# Patient Record
Sex: MALE | Race: WHITE
[De-identification: names, ages, dates, MRNs, and addresses within clinical notes are randomized per-mention and may not be internally consistent; named-entity substitution may affect disease eponyms.]

---

## 2019-06-21 ENCOUNTER — HOSPITAL ENCOUNTER (INPATIENT)
Dept: HOSPITAL 26 - MED | Age: 69
LOS: 3 days | Discharge: HOME | DRG: 280 | End: 2019-06-24
Attending: GENERAL PRACTICE | Admitting: GENERAL PRACTICE
Payer: COMMERCIAL

## 2019-06-21 VITALS — SYSTOLIC BLOOD PRESSURE: 124 MMHG | DIASTOLIC BLOOD PRESSURE: 82 MMHG

## 2019-06-21 VITALS — SYSTOLIC BLOOD PRESSURE: 151 MMHG | DIASTOLIC BLOOD PRESSURE: 106 MMHG

## 2019-06-21 VITALS — WEIGHT: 178 LBS | BODY MASS INDEX: 26.98 KG/M2 | HEIGHT: 68 IN

## 2019-06-21 VITALS — SYSTOLIC BLOOD PRESSURE: 118 MMHG | DIASTOLIC BLOOD PRESSURE: 76 MMHG

## 2019-06-21 VITALS — SYSTOLIC BLOOD PRESSURE: 115 MMHG | DIASTOLIC BLOOD PRESSURE: 69 MMHG

## 2019-06-21 VITALS — DIASTOLIC BLOOD PRESSURE: 87 MMHG | SYSTOLIC BLOOD PRESSURE: 119 MMHG

## 2019-06-21 VITALS — SYSTOLIC BLOOD PRESSURE: 96 MMHG | DIASTOLIC BLOOD PRESSURE: 78 MMHG

## 2019-06-21 DIAGNOSIS — F17.200: ICD-10-CM

## 2019-06-21 DIAGNOSIS — F15.10: ICD-10-CM

## 2019-06-21 DIAGNOSIS — Z79.899: ICD-10-CM

## 2019-06-21 DIAGNOSIS — J44.1: ICD-10-CM

## 2019-06-21 DIAGNOSIS — Z71.6: ICD-10-CM

## 2019-06-21 DIAGNOSIS — Z91.14: ICD-10-CM

## 2019-06-21 DIAGNOSIS — I21.A1: Primary | ICD-10-CM

## 2019-06-21 DIAGNOSIS — Z23: ICD-10-CM

## 2019-06-21 DIAGNOSIS — I48.2: ICD-10-CM

## 2019-06-21 DIAGNOSIS — Z79.82: ICD-10-CM

## 2019-06-21 DIAGNOSIS — I50.43: ICD-10-CM

## 2019-06-21 DIAGNOSIS — I11.0: ICD-10-CM

## 2019-06-21 DIAGNOSIS — I42.9: ICD-10-CM

## 2019-06-21 DIAGNOSIS — Z71.51: ICD-10-CM

## 2019-06-21 LAB
ALBUMIN FLD-MCNC: 3.4 G/DL (ref 3.4–5)
ANION GAP SERPL CALCULATED.3IONS-SCNC: 10 MMOL/L (ref 8–16)
APPEARANCE UR: CLEAR
AST SERPL-CCNC: 24 U/L (ref 15–37)
BARBITURATES UR QL SCN: (no result) NG/ML
BASOPHILS # BLD AUTO: 0 K/UL (ref 0–0.22)
BASOPHILS NFR BLD AUTO: 0.4 % (ref 0–2)
BENZODIAZ UR QL SCN: (no result) NG/ML
BILIRUB SERPL-MCNC: 1.1 MG/DL (ref 0–1)
BILIRUB UR QL STRIP: NEGATIVE
BUN SERPL-MCNC: 13 MG/DL (ref 7–18)
BZE UR QL SCN: (no result) NG/ML
CANNABINOIDS UR QL SCN: (no result) NG/ML
CHLORIDE SERPL-SCNC: 106 MMOL/L (ref 98–107)
CHOLEST/HDLC SERPL: 3 {RATIO} (ref 1–4.5)
CK MB SERPL-MCNC: 2.5 NG/ML (ref 0–3.6)
CO2 SERPL-SCNC: 27.1 MMOL/L (ref 21–32)
COLOR UR: YELLOW
CREAT SERPL-MCNC: 1.1 MG/DL (ref 0.7–1.3)
EOSINOPHIL # BLD AUTO: 0.1 K/UL (ref 0–0.4)
EOSINOPHIL NFR BLD AUTO: 1 % (ref 0–4)
ERYTHROCYTE [DISTWIDTH] IN BLOOD BY AUTOMATED COUNT: 18.6 % (ref 11.6–13.7)
GFR SERPL CREATININE-BSD FRML MDRD: 85 ML/MIN (ref 90–?)
GLUCOSE SERPL-MCNC: 116 MG/DL (ref 74–106)
GLUCOSE UR STRIP-MCNC: NEGATIVE MG/DL
HCT VFR BLD AUTO: 40.8 % (ref 36–52)
HDLC SERPL-MCNC: 37 MG/DL (ref 40–60)
HGB BLD-MCNC: 13.2 G/DL (ref 12–18)
HGB UR QL STRIP: NEGATIVE
LDLC SERPL CALC-MCNC: 59 MG/DL (ref 60–100)
LEUKOCYTE ESTERASE UR QL STRIP: NEGATIVE
LYMPHOCYTES # BLD AUTO: 1.9 K/UL (ref 2–11.5)
LYMPHOCYTES NFR BLD AUTO: 19.4 % (ref 20.5–51.1)
MAGNESIUM SERPL-MCNC: 2 MG/DL (ref 1.8–2.4)
MCH RBC QN AUTO: 25 PG (ref 27–31)
MCHC RBC AUTO-ENTMCNC: 32 G/DL (ref 33–37)
MCV RBC AUTO: 76.9 FL (ref 80–94)
MONOCYTES # BLD AUTO: 0.7 K/UL (ref 0.8–1)
MONOCYTES NFR BLD AUTO: 7.3 % (ref 1.7–9.3)
NEUTROPHILS # BLD AUTO: 6.9 K/UL (ref 1.8–7.7)
NEUTROPHILS NFR BLD AUTO: 71.9 % (ref 42.2–75.2)
NITRITE UR QL STRIP: NEGATIVE
OPIATES UR QL SCN: (no result) NG/ML
PCP UR QL SCN: (no result) NG/ML
PH UR STRIP: 7 [PH] (ref 5–9)
PHOSPHATE SERPL-MCNC: 3 MG/DL (ref 2.5–4.9)
PLATELET # BLD AUTO: 306 K/UL (ref 140–450)
POTASSIUM SERPL-SCNC: 4.1 MMOL/L (ref 3.5–5.1)
PROTHROMBIN TIME: 10.1 SECS (ref 10.8–13.4)
RBC # BLD AUTO: 5.3 MIL/UL (ref 4.2–6.1)
SODIUM SERPL-SCNC: 139 MMOL/L (ref 136–145)
TRIGL SERPL-MCNC: 77 MG/DL (ref 30–150)
TSH SERPL DL<=0.05 MIU/L-ACNC: 2.27 UIU/ML (ref 0.34–3.74)
WBC # BLD AUTO: 9.7 K/UL (ref 4.8–10.8)

## 2019-06-21 RX ADMIN — RIVAROXABAN SCH MG: 10 TABLET, FILM COATED ORAL at 16:49

## 2019-06-21 RX ADMIN — Medication SCH MG: at 09:00

## 2019-06-21 RX ADMIN — LISINOPRIL SCH MG: 5 TABLET ORAL at 09:02

## 2019-06-21 RX ADMIN — NICOTINE SCH MG: 21 PATCH, EXTENDED RELEASE TOPICAL at 08:58

## 2019-06-21 NOTE — NUR
PT SEEN AWAKE, TACHYPNEIC, MOUTH BREATHER, INSTRUCTED PT TO BREATH THROUGH HIS NOSTRILS, O2 
SAT-95% ON 2L NC, MONITORED CLOSELY.

## 2019-06-21 NOTE — NUR
AMBULATED TO  WITH STEADY GAIT AND VOIDED FREELY WITH CLEAR YELLOW URINE 150ML, URINE 
SPECIMEN SENT TO LAB FOR TEST, PT REFUSED TO TAKE OFF HIS PANTS, ENCOURAGE TO TAKE A SHOWER 
OR BRUSH HIS TEETH BUT PT STATED "NOT RIGHT NOW", DR BAUER MADE AWARE.

## 2019-06-21 NOTE — NUR
PATIENT IS SITTING UP ON BED AND WATCHING TV. DENIES PAIN AND SOB. NO SIGNS OF DISTRESS 
NOTED. TELE MONITOR IN PLACE. BED IN LOW POSITION AND CALL LIGHT WITHIN REACH. INSTRUCTED 
PATIENT TO UES THE CALL LIGHT FOR ANY ASSISTANCE AND PATIENT WAS AWARE.

## 2019-06-21 NOTE — NUR
ENDORSED PATIENT AT BEDSIDE TO NIGHT SHIFT NURSE FOR CONTINUITY OF CARE. PATIENT IS AWAKE 
AND WATCHING TV ON BED AT THIS TIME. RESPIRATION EVEN AND UNLABORED ON RA, SPO2 IS 96%. NO 
SIGNS OF DISTRESS NOTED. TELE MONITOR IN PLACE. SAFETY MEASURES IN PLACE.

## 2019-06-21 NOTE — NUR
PATIENT IS AWAKE AND SITTING UP ON BED. DENIES PAIN AND SOB. ASKED IF PATIENT WOULD LIKE TO 
TAKE A SHOWER AT THIS TIME, PATIENT SAID " NAH, NOT NOW. I WILL LET YOU KNOW WHEN I WANT TO 
SHOWER." TELE MONITOR IN PLACE. BED IN LOW POSITION AND CALL LIGHT WITHIN REACH. INSTRUCTED 
PATIENT TO USE THE CALL LIGHT FOR ANY ASSISTANCE AND PATIENT WAS AWARE.

## 2019-06-21 NOTE — NUR
Patient will be admitted to care of DR VELASQUEZ. Admited to TELE.  Will go to room 
125-B. Belongings list completed.  Report to ROMEO PAREKH.

## 2019-06-21 NOTE — NUR
PATIENT CAME BACK FROM WALKING ON THE [address]. PATIENT DENIES PAIN, LIGHTHEADEDNESS AND 
SOB. REQUESTED FOR AN APPLE JUICE AND PROVIDED. ASKED IF PATIENT WANT TO TAKE A SHOWER AND 
PATIENT SAID " NO." CNA IS CHANGING PATIENT'S BED. NO SIGNS OF DISTRESS NOTED. TELE MONITOR 
IN PLACE. SPO2 MONITOR IN PLACE. BED IN LOW POSITION AND CALL LIGHT WITHIN REACH. INSTRUCTED 
PATIENT TO USE THE CALL LIGHT FOR ANY ASSISTANCE AND PATIENT WAS AWARE.

## 2019-06-21 NOTE — NUR
PT IN LOW BED WITH SIDE RAILS UP X2. HE IS AOX4, WITH NO S/S OF PAIN OR DISTRESS NOTED. V/S 
AS FOLLOWS T 98.7 P 60 R 18 B/P 129/87 02 97%. WIFE INOCENTE AT BEDSIDE. CALL SOTO IN REACH.

## 2019-06-21 NOTE — NUR
RECEIVED CRITICAL VALUE FOR TROPONIN 0.071 AND NOTIFIED DR ROSADO. DR ROSADO WAS AWARE. 
PER DR ROSADO, PATIENT HAS A CONSULT FOR DR EMMANUEL.

## 2019-06-21 NOTE — NUR
PATIENT IS RESTING ON BED AT THIS TIME. DENIES PAIN AND SOB. NO SIGNS OF DISTRESS NOTED.  
TELE MONITOR ATTACHED. SAFETY MEASURES IN PLACE. BED IN LOW POSITION AND CALL LIGHT WITHIN 
REACH. INSTRUCTED PATIENT TO USE THE CALL LIGHT FOR ANY ASSISTANCE AND PATIENT WAS AWARE.

## 2019-06-21 NOTE — NUR
ASSISTED PATIENT TO USE THE BATHROOM AND GET BACK ON BATHROOM. APPLIED 2LPM VIA NC ON 
PATIENT, AND SPO2 IS 99% AS THIS TIME. ASKED PATIENT IF HE WOULD LIKE TO TAKE A SHOWER, 
PATIENT SAID " NOT YET, MAYBE LATER. I DON'T FEEL LIKE IT NOW." SAFETY MEASURES IN PLACE. 
TELE MONITOR ATTACHED. SPO2 MONITOR ATTACHED. BED IN LOW POSITION AND CALL LIGHT WITHIN 
REACH. INSTRUCTED PATIENT TO USE THE CALL LIGHT FOR ANY ASSISTANCE AND PATIENT WAS AWARE.

## 2019-06-21 NOTE — NUR
PATIENT IS RESTING ON BED AT THIS TIME. NO SIGNS OF DISTRESS NOTED. TELE MONITOR ATTACHED. 
SAFETY MEASURES IN PLACE. BED IN LOW POSITION AND CALL LIGHT WITHIN REACH. INSTRUCTED 
PATIENT TO USE THE CALL LIGHT FOR ANY ASSISTANCE AND PATIENT VERBALIZED UNDERSTANDING.

## 2019-06-21 NOTE — NUR
RELIEVED REPORT AT BEDSIDE FORM BENY RN DAYSHIFT NURSE AT BEDSIDE FOR CONTINUITY OF CARE, 
PT IN STABLE CONDITION.

## 2019-06-21 NOTE — NUR
CHECKED BP PRIOR TO MED ADMINISTER, /72, MAP 86, PULSE 66. MEDICATION EDUCATION 
PROVIDED TO PATIENT AND PATIENT VERBALIZED UNDERSTANDING. ADMINISTERED MEDS AS PER MD ORDER, 
PATIENT TOLERATED WELL. PATIENT IS LYING DOWN ON BED AND TALKING TO WIFE HUNTER ON THE 
PHONE. RESPIRATION EVEN AND UNLABORED ON 2LPM VIA NC, SPO2 IS AT 96% AT THIS TIME. NO SIGNS 
OF DISTRESS NOTED. TELE MONITOR ATTACHED. BED IN LOW POSITION AND CALL LIGHT WITHIN REACH. 
INSTRUCTED PATIENT TO USE THE CALL LIGHT FOR ANY ASSISTANCE AND PATIENT WAS AWARE.

## 2019-06-21 NOTE — NUR
RECEIVED BEDSIDE REPORT FROM NIGHT SHIFT NURSE FOR CONTINUITY OF CARE. PATIENT IS AWAKE AND 
RESTING ON BED AT THIS TIME. PATIENT IS AAOX4. RESPIRATION EVEN AND UNLABORED ON 2LPM VIA 
NC, SPO2 IS AT 96%. DENIES PAIN AND SOB.  NO SIGNS OF DISTRESS NOTED. IV ON L [id number], 
INTACT AND CLEAN, SL. SKIN INTACT AND CLEAN. PATIENT IS ABLE TO AMBULATE WITH STANDBY 
ASSIST. DISCUSSED PLAN OF CARE WITH PATIENT, AND PATIENT VERBALIZED. TELE MONITOR IN PLACE. 
SPO2 MONITOR BY BEDSIDE. BED IN LOW POSITION AND CALL LIGHT WITHIN REACH. INSTRUCTED PATIENT 
TO USE THE CALL LIGHT FOR ANY ASSISTANCE, PATIENT IS AWARE.

## 2019-06-21 NOTE — NUR
PT TO ED WITH C/O SOB FOR PAST 2 DAYS . PT HAS HX OF A.FIB, CHF AND HTN AND HAS 
BEEN UNABLE TO TAKE MEDICATIONS FOR PAST 2 MONTHS DOES NOT RECALL NAMES OF 
MEDICATIONS OTHER THAN XARELTO. PT AWAKE AND SPEAKS SENTENCES BUT BECOMES SOB 
VERY EASILY. LUNG SOUNDS DIMISHED BILATERALLY. PT PLACED INTO BED, ER MD AT 
BEDSIDE.

## 2019-06-22 VITALS — DIASTOLIC BLOOD PRESSURE: 80 MMHG | SYSTOLIC BLOOD PRESSURE: 112 MMHG

## 2019-06-22 VITALS — DIASTOLIC BLOOD PRESSURE: 98 MMHG | SYSTOLIC BLOOD PRESSURE: 128 MMHG

## 2019-06-22 VITALS — SYSTOLIC BLOOD PRESSURE: 137 MMHG | DIASTOLIC BLOOD PRESSURE: 94 MMHG

## 2019-06-22 VITALS — DIASTOLIC BLOOD PRESSURE: 94 MMHG | SYSTOLIC BLOOD PRESSURE: 124 MMHG

## 2019-06-22 VITALS — SYSTOLIC BLOOD PRESSURE: 143 MMHG | DIASTOLIC BLOOD PRESSURE: 88 MMHG

## 2019-06-22 VITALS — SYSTOLIC BLOOD PRESSURE: 117 MMHG | DIASTOLIC BLOOD PRESSURE: 71 MMHG

## 2019-06-22 LAB
ANION GAP SERPL CALCULATED.3IONS-SCNC: 14.8 MMOL/L (ref 8–16)
BASOPHILS # BLD AUTO: 0 K/UL (ref 0–0.22)
BASOPHILS NFR BLD AUTO: 0.5 % (ref 0–2)
BUN SERPL-MCNC: 14 MG/DL (ref 7–18)
CHLORIDE SERPL-SCNC: 106 MMOL/L (ref 98–107)
CO2 SERPL-SCNC: 25.5 MMOL/L (ref 21–32)
CREAT SERPL-MCNC: 1.1 MG/DL (ref 0.7–1.3)
EOSINOPHIL # BLD AUTO: 0.1 K/UL (ref 0–0.4)
EOSINOPHIL NFR BLD AUTO: 1.9 % (ref 0–4)
ERYTHROCYTE [DISTWIDTH] IN BLOOD BY AUTOMATED COUNT: 18.7 % (ref 11.6–13.7)
GFR SERPL CREATININE-BSD FRML MDRD: 85 ML/MIN (ref 90–?)
GLUCOSE SERPL-MCNC: 94 MG/DL (ref 74–106)
HCT VFR BLD AUTO: 41.6 % (ref 36–52)
HGB BLD-MCNC: 13.4 G/DL (ref 12–18)
LYMPHOCYTES # BLD AUTO: 2.1 K/UL (ref 2–11.5)
LYMPHOCYTES NFR BLD AUTO: 28.2 % (ref 20.5–51.1)
MAGNESIUM SERPL-MCNC: 2.1 MG/DL (ref 1.8–2.4)
MCH RBC QN AUTO: 25 PG (ref 27–31)
MCHC RBC AUTO-ENTMCNC: 32 G/DL (ref 33–37)
MCV RBC AUTO: 77.3 FL (ref 80–94)
MONOCYTES # BLD AUTO: 0.8 K/UL (ref 0.8–1)
MONOCYTES NFR BLD AUTO: 10.5 % (ref 1.7–9.3)
NEUTROPHILS # BLD AUTO: 4.4 K/UL (ref 1.8–7.7)
NEUTROPHILS NFR BLD AUTO: 58.9 % (ref 42.2–75.2)
PHOSPHATE SERPL-MCNC: 3 MG/DL (ref 2.5–4.9)
PLATELET # BLD AUTO: 270 K/UL (ref 140–450)
POTASSIUM SERPL-SCNC: 4.3 MMOL/L (ref 3.5–5.1)
RBC # BLD AUTO: 5.39 MIL/UL (ref 4.2–6.1)
SODIUM SERPL-SCNC: 142 MMOL/L (ref 136–145)
WBC # BLD AUTO: 7.4 K/UL (ref 4.8–10.8)

## 2019-06-22 RX ADMIN — NICOTINE SCH MG: 21 PATCH, EXTENDED RELEASE TOPICAL at 09:39

## 2019-06-22 RX ADMIN — FUROSEMIDE SCH MG: 10 INJECTION, SOLUTION INTRAMUSCULAR; INTRAVENOUS at 20:25

## 2019-06-22 RX ADMIN — Medication SCH MG: at 09:40

## 2019-06-22 RX ADMIN — LISINOPRIL SCH MG: 5 TABLET ORAL at 09:40

## 2019-06-22 RX ADMIN — RIVAROXABAN SCH MG: 10 TABLET, FILM COATED ORAL at 16:48

## 2019-06-22 NOTE — NUR
PT IS AWAKE AND DR. ABRAHAM CAME AND SPOKE TO PT, V/S CHECKED AND BP /73, PULSE IS 69. 
02 SATURATION IS 97%, NAZIA AND PATCH MEDICATIONS WERE GIVEN AND PT TOLERATED IT. NO SIGN OF 
DISTRESS NOTED AND WILL MONITOR PT.

## 2019-06-22 NOTE — NUR
PATIENT HAS BEEN SCREENED AND CATEGORIZED AS MODERATE NUTRITION RISK. PATIENT WILL BE SEEN 
WITHIN 3-5 DAYS OF ADMISSION.



6/23/19-6/25/19



GRACIELA DUNN RD

## 2019-06-22 NOTE — NUR
PT'S HEART RATE ON MONITOR WENT UP , PT VERBALIZED THAT HE FEELS OK. INFORMED DR. PINEDA 
AND SHOWED EKG READING, MD SAID THAT IT'S FINE. WILL MONITOR PT

## 2019-06-22 NOTE — NUR
CARE ENDORSED TO KASSANDRA WALTERS DAYSHIFT NURSE AT BEDSIDE FOR CONTINUITY OF CARE, PT IN STABLE 
CONDITION.

## 2019-06-22 NOTE — NUR
PT IN BED ASLEEP BUT AROUSABLE TO NAME AND LIGHT TOUCH.V/S A FOLLOWS T 97.0 P 62 R 18 B/P 
96/78 02 97% ON ROOM AIR. NO S/S OF PAIN OR DISTRESS NOTED. PT LAMBERTO ANY PAIN OR DISTRESS.

## 2019-06-22 NOTE — NUR
RECEIVED PATIENT FOR CONTINUITY OF CARE. PATIENT IS ALERT, AWAKE, LYING DOWN IN BED. ON ROOM 
AIR. WITH LEFT HAND 18 G SALINE LOCK. PATIENT IS IN STABLE CONDITION. WILL MONITOR PATIENT 
THROUGHOUT SHIFT.

## 2019-06-22 NOTE — NUR
PT IS AWAKE AND SEATED ON THE BED, VITAL SIGNS CHECKED AND BP /94, PULSE IS 82, 
TEMPERATURE IS 98.5, O2 SATURATION IS 99%, RESPIRATION IS 22/MIN AND EVEN.NO SIGN OF 
DISTRESS NOTED AN WILL MONITOR PT.

## 2019-06-22 NOTE — NUR
PT IN BED SLEEPING BUT AROUSABLE TO NAME, V/S AS FOLLOWS T T 97.8 P 62 R 18 B/P 143/88 02 
94% ON ROOM AIR.NO S/S OF PAIN OR DISTRESS NOTED.

## 2019-06-22 NOTE — NUR
RECEIVED PT FROM NIGHT SHIFT NURSE, PT IS AWAKE AND LYING ON THE BED WITH SIDE RAILS UP AND 
CALL LIGHT WITHIN REACH, PT HAS AN IV LINE ON THE LEFT HAND G.18 ON SALINE LOCK, PT DENIES 
PAIN AND NO SOB NOTED. WILL MONITOR PT.

## 2019-06-23 VITALS — DIASTOLIC BLOOD PRESSURE: 62 MMHG | SYSTOLIC BLOOD PRESSURE: 99 MMHG

## 2019-06-23 VITALS — SYSTOLIC BLOOD PRESSURE: 137 MMHG | DIASTOLIC BLOOD PRESSURE: 84 MMHG

## 2019-06-23 VITALS — SYSTOLIC BLOOD PRESSURE: 123 MMHG | DIASTOLIC BLOOD PRESSURE: 84 MMHG

## 2019-06-23 VITALS — SYSTOLIC BLOOD PRESSURE: 136 MMHG | DIASTOLIC BLOOD PRESSURE: 86 MMHG

## 2019-06-23 VITALS — SYSTOLIC BLOOD PRESSURE: 138 MMHG | DIASTOLIC BLOOD PRESSURE: 78 MMHG

## 2019-06-23 VITALS — SYSTOLIC BLOOD PRESSURE: 121 MMHG | DIASTOLIC BLOOD PRESSURE: 91 MMHG

## 2019-06-23 LAB
ANION GAP SERPL CALCULATED.3IONS-SCNC: 12.9 MMOL/L (ref 8–16)
BASOPHILS # BLD AUTO: 0 K/UL (ref 0–0.22)
BASOPHILS NFR BLD AUTO: 0.7 % (ref 0–2)
BUN SERPL-MCNC: 22 MG/DL (ref 7–18)
CHLORIDE SERPL-SCNC: 106 MMOL/L (ref 98–107)
CO2 SERPL-SCNC: 24.9 MMOL/L (ref 21–32)
CREAT SERPL-MCNC: 1.2 MG/DL (ref 0.7–1.3)
EOSINOPHIL # BLD AUTO: 0.2 K/UL (ref 0–0.4)
EOSINOPHIL NFR BLD AUTO: 2.9 % (ref 0–4)
ERYTHROCYTE [DISTWIDTH] IN BLOOD BY AUTOMATED COUNT: 18.3 % (ref 11.6–13.7)
GFR SERPL CREATININE-BSD FRML MDRD: 77 ML/MIN (ref 90–?)
GLUCOSE SERPL-MCNC: 97 MG/DL (ref 74–106)
HCT VFR BLD AUTO: 41.3 % (ref 36–52)
HGB BLD-MCNC: 13.5 G/DL (ref 12–18)
LYMPHOCYTES # BLD AUTO: 2 K/UL (ref 2–11.5)
LYMPHOCYTES NFR BLD AUTO: 30.5 % (ref 20.5–51.1)
MAGNESIUM SERPL-MCNC: 2 MG/DL (ref 1.8–2.4)
MCH RBC QN AUTO: 25 PG (ref 27–31)
MCHC RBC AUTO-ENTMCNC: 33 G/DL (ref 33–37)
MCV RBC AUTO: 76.5 FL (ref 80–94)
MONOCYTES # BLD AUTO: 0.6 K/UL (ref 0.8–1)
MONOCYTES NFR BLD AUTO: 9.6 % (ref 1.7–9.3)
NEUTROPHILS # BLD AUTO: 3.8 K/UL (ref 1.8–7.7)
NEUTROPHILS NFR BLD AUTO: 56.3 % (ref 42.2–75.2)
PHOSPHATE SERPL-MCNC: 3.8 MG/DL (ref 2.5–4.9)
PLATELET # BLD AUTO: 272 K/UL (ref 140–450)
POTASSIUM SERPL-SCNC: 3.8 MMOL/L (ref 3.5–5.1)
RBC # BLD AUTO: 5.4 MIL/UL (ref 4.2–6.1)
SODIUM SERPL-SCNC: 140 MMOL/L (ref 136–145)
WBC # BLD AUTO: 6.7 K/UL (ref 4.8–10.8)

## 2019-06-23 RX ADMIN — Medication SCH MG: at 08:55

## 2019-06-23 RX ADMIN — FUROSEMIDE SCH MG: 10 INJECTION, SOLUTION INTRAMUSCULAR; INTRAVENOUS at 08:56

## 2019-06-23 RX ADMIN — RIVAROXABAN SCH MG: 10 TABLET, FILM COATED ORAL at 16:36

## 2019-06-23 RX ADMIN — LISINOPRIL SCH MG: 5 TABLET ORAL at 08:56

## 2019-06-23 RX ADMIN — NICOTINE SCH MG: 21 PATCH, EXTENDED RELEASE TOPICAL at 08:54

## 2019-06-23 NOTE — NUR
PT IS AWAKE AND VITAL SIGNS CHECKED DONE, BP IS 99/62, PULSE , TEMP. IS 97.8, O2 
SATURATION IS 93%, RESPIRATION IS EVEN AND AT 18/MIN, NO SIGN OF DISTRESS NOTED AND WILL 
MONITOR PT.

## 2019-06-23 NOTE — NUR
PATIENT LYING DOWN, ASLEEP. VITAL SIGNS TAKEN AND CHARTED. PATIENT DENIES PAIN AT THIS TIME. 
WILL CONTINUE TO MONITOR PATIENT.

## 2019-06-23 NOTE — NUR
RECEIVED BEDSIDE REPORT FROM DAY SHIFT RN KASSANDRA. PT A/OX 4. DISCUSSED PLAN OF CARE. 
VERBALIZED UNDERSTANDING. ABLE TO MAKE NEEDS KNOWN. STD PRECAUTIONS. CONTINENT. AMBULATORY. 
STEADY GAIT. WIFE AT BEDSIDE. TELE MONITOR. CARDIAC DIET. ROOM AIR. NO SIGNS OF DISTRESS. 
SKIN IS INTACT. L HAND 18G SALINE LOCK. PATENT AND INTACT. BED IN LOW POSITION. CALL LIGHT 
WITHIN REACH. WILL CONTINUE TO MONITOR.

## 2019-06-23 NOTE — NUR
OPT'S HEART RATE WAS SEEN TO  IN THE CARDIAC MONITOR AND PT WAS CHECKED, PT IS 
ASYMPTOMATIC AND WAS ABOUT TO EAT HIS LUNCH. WILL MONITOR PT.

## 2019-06-23 NOTE — NUR
ROOM IS READY FOR PT IN BED A TO BE MOVED. PT IN BED (A) FAMILY IS STILL AT BEDSIDE. MADE PT 
AWARE THAT ROOM IS READY FOR PT IN BED A TO BE MOVED. WAITING ON NURSE FOR BED A. WILL 
CONTINUE TO MONITOR.

## 2019-06-23 NOTE — NUR
RECEIVED PT FROM NIGHT SHIFT NURSE, PT IS ASLEEP LYING ON THE BED WITH SIDE RAILS UP AND 
CALL LIGHT WITHIN REACH, PT HAS AN IV LINE ON THE LEFT HAND G. 18 ON SALINE LOCK, 
RESPIRATION IS EVEN AND NO SIGN OF DISTRESS NOTED. WILL MONITOR PT.

## 2019-06-23 NOTE — NUR
PT IS AWAKE AND PARAMETER CHECKED, ORAL MEDICATION WAS CHECKED AND PT TOLERATED IT. WILL 
MONITOR PT.

## 2019-06-23 NOTE — NUR
PT REQUESTED TO BE MOVED FROM ROOM. AND TO CALL WIFE TO TAKE HIM HOME DUE TO MUCH PEOPLE IN 
THE ROOM TALKING WITH THE PATIENT IN BED A. I ASKED PT IF WE CAN GET HIM SETTLED INTO 
ANOTHER ROOM WHERE IT WILL BE MORE QUIET. HE AGREED.

## 2019-06-23 NOTE — NUR
ENDORSED PATIENT TO AM SHIFT NURSE FOR CONTINUITY OF CARE. PATIENT IS LYING DOWN IN BED, ON 
STABLE CONDITION.

## 2019-06-23 NOTE — NUR
MADE ROUNDS. PATIENT LYING DOWN IN BED, ASLEEP WITH NO SIGNS OF DISTRESS. WILL CONTINUE TO 
MONITOR PATIENT.

## 2019-06-23 NOTE — NUR
PATIENT IS LYING DOWN, STILL SLEEPING, WITH NO SIGNS OF DISTRESS. WILL CONTINUE TO MONITOR 
PATIENT.

## 2019-06-23 NOTE — NUR
PT NO LONGER SHARING THE ROOM. BED A WAS MOVED TO ANOTHER ROOM. PT STATES HES MORE 
COMFORTABLE AND HE WILL STAY IN THE HOSPITAL AND NO LONGER BE LEAVING.

## 2019-06-23 NOTE — NUR
PT IS AWAKE AND VITAL SIGNS CHECKED DONE, BP IS 91/50, PULSE IS 62, O2 SATURATION IS 93%, 
ORAL MEDICATIONS WERE GIVEN BUT LASIX AND LISINOPRIL WERE NOT GIVEN TO PT AND WERE HELD 
BECAUSE OF THE LOW BP READING, DR. IRBY NOTIFIED. NO SIGN OF DISTRESS NOTED AND WILL 
MONITOR PT.

## 2019-06-24 VITALS — SYSTOLIC BLOOD PRESSURE: 118 MMHG | DIASTOLIC BLOOD PRESSURE: 75 MMHG

## 2019-06-24 VITALS — DIASTOLIC BLOOD PRESSURE: 77 MMHG | SYSTOLIC BLOOD PRESSURE: 99 MMHG

## 2019-06-24 VITALS — SYSTOLIC BLOOD PRESSURE: 120 MMHG | DIASTOLIC BLOOD PRESSURE: 77 MMHG

## 2019-06-24 LAB
ANION GAP SERPL CALCULATED.3IONS-SCNC: 11.7 MMOL/L (ref 8–16)
BASOPHILS # BLD AUTO: 0 K/UL (ref 0–0.22)
BASOPHILS NFR BLD AUTO: 0.6 % (ref 0–2)
BUN SERPL-MCNC: 25 MG/DL (ref 7–18)
CHLORIDE SERPL-SCNC: 107 MMOL/L (ref 98–107)
CO2 SERPL-SCNC: 26.6 MMOL/L (ref 21–32)
CREAT SERPL-MCNC: 1.3 MG/DL (ref 0.7–1.3)
EOSINOPHIL # BLD AUTO: 0.2 K/UL (ref 0–0.4)
EOSINOPHIL NFR BLD AUTO: 2.4 % (ref 0–4)
ERYTHROCYTE [DISTWIDTH] IN BLOOD BY AUTOMATED COUNT: 18.6 % (ref 11.6–13.7)
GFR SERPL CREATININE-BSD FRML MDRD: 70 ML/MIN (ref 90–?)
GLUCOSE SERPL-MCNC: 99 MG/DL (ref 74–106)
HCT VFR BLD AUTO: 42.3 % (ref 36–52)
HGB BLD-MCNC: 13.6 G/DL (ref 12–18)
LYMPHOCYTES # BLD AUTO: 2.4 K/UL (ref 2–11.5)
LYMPHOCYTES NFR BLD AUTO: 32.5 % (ref 20.5–51.1)
MCH RBC QN AUTO: 25 PG (ref 27–31)
MCHC RBC AUTO-ENTMCNC: 32 G/DL (ref 33–37)
MCV RBC AUTO: 77.2 FL (ref 80–94)
MONOCYTES # BLD AUTO: 0.6 K/UL (ref 0.8–1)
MONOCYTES NFR BLD AUTO: 8.6 % (ref 1.7–9.3)
NEUTROPHILS # BLD AUTO: 4.1 K/UL (ref 1.8–7.7)
NEUTROPHILS NFR BLD AUTO: 55.9 % (ref 42.2–75.2)
PLATELET # BLD AUTO: 297 K/UL (ref 140–450)
POTASSIUM SERPL-SCNC: 4.3 MMOL/L (ref 3.5–5.1)
RBC # BLD AUTO: 5.48 MIL/UL (ref 4.2–6.1)
SODIUM SERPL-SCNC: 141 MMOL/L (ref 136–145)
WBC # BLD AUTO: 7.4 K/UL (ref 4.8–10.8)

## 2019-06-24 PROCEDURE — 3E0234Z INTRODUCTION OF SERUM, TOXOID AND VACCINE INTO MUSCLE, PERCUTANEOUS APPROACH: ICD-10-PCS | Performed by: GENERAL PRACTICE

## 2019-06-24 RX ADMIN — LISINOPRIL SCH MG: 5 TABLET ORAL at 09:53

## 2019-06-24 RX ADMIN — Medication SCH MG: at 09:54

## 2019-06-24 RX ADMIN — NICOTINE SCH MG: 21 PATCH, EXTENDED RELEASE TOPICAL at 09:54

## 2019-06-24 NOTE — NUR
PT IS SLEEPING IN BED EASILY AROUSABLE. EVEN CHEST RISE. NO SOB. NO SIGNS OF DISTRESS. NO 
PAIN AT THIS TIME. WILL CONTINUE TO MONITOR.

## 2019-06-24 NOTE — NUR
RECEIVED BEDSIDE REPORT FROM NIGHT SHIFT NURSE. PT IS ASLEEP, NO S/S OF ANY ACUTE DISTRESS 
OR SOB. PT IS ON ROOM AIR, SKIN IS INTACT. IV IS ON THE L HAND, 18 G, SALINE LOCKED. PT IS 
AMBULATORY AND NOT A FALL RISK. PT IS ON 1500 ML/DAY FLUID RESTRICTION. CALL LIGHT IS WITHIN 
REACH. WILL CONTINUE TO MONITOR.

## 2019-06-24 NOTE — NUR
VITALS TAKEN. TOLERATED WELL. 98.2 TEMP. 120/77 BP. 97%. 119 PULSE. PT REMAINS TACHYCARDIC. 
WILL CONTINUE TO MONITOR.

## 2019-06-24 NOTE — NUR
PT HAS DISCHARGED. PNA VACCINE WAS GIVEN UPON DC. WRIST BANDS AND IV SITE REMOVED. DISCHARGE 
INSTRUCTIONS AND PRESCRIPTIONS GIVEN, PT VERBALIZED UNDERSTANDING. PT LEFT WITH HIS WIFE.

## 2019-06-24 NOTE — NUR
ASSISTED PT TO BATHROOM. AMBULATORY. STEADY GAIT. BACK IN BED. NO SIGNS OF RESP DISTRESS. NO 
SOB. WILL CONTINUE TO MONITOR.

## 2019-06-24 NOTE — NUR
WILL ENDORSE PT TO DAY SHIFT RN FOR CONTINUITY OF CARE. PT IN STABLE CONDITION. WILL 
CONTINUE TO MONITOR.

## 2019-06-24 NOTE — NUR
PT'S WIFE INOCENTE CALLED BACK, I TOLD HER THAT PT IS READY TO BE PICKED UP TO GO HOME. SHE 
SAID SHE WILL BE ON HER WAY SOON.

## 2019-09-10 ENCOUNTER — HOSPITAL ENCOUNTER (INPATIENT)
Dept: HOSPITAL 26 - MED | Age: 69
LOS: 3 days | Discharge: LEFT BEFORE DISCHARGE | DRG: 901 | End: 2019-09-13
Attending: GENERAL PRACTICE | Admitting: GENERAL PRACTICE
Payer: COMMERCIAL

## 2019-09-10 VITALS — WEIGHT: 151 LBS | HEIGHT: 68 IN | BODY MASS INDEX: 22.88 KG/M2

## 2019-09-10 VITALS — DIASTOLIC BLOOD PRESSURE: 107 MMHG | SYSTOLIC BLOOD PRESSURE: 149 MMHG

## 2019-09-10 VITALS — DIASTOLIC BLOOD PRESSURE: 90 MMHG | SYSTOLIC BLOOD PRESSURE: 150 MMHG

## 2019-09-10 VITALS — DIASTOLIC BLOOD PRESSURE: 73 MMHG | SYSTOLIC BLOOD PRESSURE: 125 MMHG

## 2019-09-10 VITALS — DIASTOLIC BLOOD PRESSURE: 75 MMHG | SYSTOLIC BLOOD PRESSURE: 105 MMHG

## 2019-09-10 VITALS — DIASTOLIC BLOOD PRESSURE: 86 MMHG | SYSTOLIC BLOOD PRESSURE: 128 MMHG

## 2019-09-10 DIAGNOSIS — L02.214: ICD-10-CM

## 2019-09-10 DIAGNOSIS — I48.91: ICD-10-CM

## 2019-09-10 DIAGNOSIS — K44.9: ICD-10-CM

## 2019-09-10 DIAGNOSIS — I11.0: ICD-10-CM

## 2019-09-10 DIAGNOSIS — Z91.14: ICD-10-CM

## 2019-09-10 DIAGNOSIS — F17.200: ICD-10-CM

## 2019-09-10 DIAGNOSIS — Z79.82: ICD-10-CM

## 2019-09-10 DIAGNOSIS — J44.1: ICD-10-CM
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
## 2019-09-10 DIAGNOSIS — F17.210: ICD-10-CM

## 2019-09-10 DIAGNOSIS — F15.10: ICD-10-CM

## 2019-09-10 DIAGNOSIS — Z53.21: ICD-10-CM

## 2019-09-10 DIAGNOSIS — Z91.19: ICD-10-CM

## 2019-09-10 DIAGNOSIS — I42.9: ICD-10-CM

## 2019-09-10 DIAGNOSIS — Z59.0: ICD-10-CM
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
## 2019-09-10 DIAGNOSIS — I50.43: ICD-10-CM

## 2019-09-10 DIAGNOSIS — T85.79XA: Primary | ICD-10-CM

## 2019-09-10 LAB
ALBUMIN FLD-MCNC: 3.2 G/DL (ref 3.4–5)
AMYLASE SERPL-CCNC: 40 U/L (ref 25–115)
ANION GAP SERPL CALCULATED.3IONS-SCNC: 13 MMOL/L (ref 8–16)
APPEARANCE UR: CLEAR
AST SERPL-CCNC: 20 U/L (ref 15–37)
BARBITURATES UR QL SCN: (no result) NG/ML
BASOPHILS # BLD AUTO: 0 K/UL (ref 0–0.22)
BASOPHILS NFR BLD AUTO: 0.3 % (ref 0–2)
BENZODIAZ UR QL SCN: (no result) NG/ML
BILIRUB SERPL-MCNC: 1.3 MG/DL (ref 0–1)
BILIRUB UR QL STRIP: NEGATIVE
BUN SERPL-MCNC: 20 MG/DL (ref 7–18)
BZE UR QL SCN: (no result) NG/ML
CANNABINOIDS UR QL SCN: (no result) NG/ML
CHLORIDE SERPL-SCNC: 109 MMOL/L (ref 98–107)
CHOLEST/HDLC SERPL: 2.9 {RATIO} (ref 1–4.5)
CO2 SERPL-SCNC: 25.8 MMOL/L (ref 21–32)
COLOR UR: YELLOW
CREAT SERPL-MCNC: 1.1 MG/DL (ref 0.7–1.3)
EOSINOPHIL # BLD AUTO: 0.1 K/UL (ref 0–0.4)
EOSINOPHIL NFR BLD AUTO: 1.5 % (ref 0–4)
ERYTHROCYTE [DISTWIDTH] IN BLOOD BY AUTOMATED COUNT: 18.1 % (ref 11.6–13.7)
GFR SERPL CREATININE-BSD FRML MDRD: 85 ML/MIN (ref 90–?)
GLUCOSE SERPL-MCNC: 108 MG/DL (ref 74–106)
GLUCOSE UR STRIP-MCNC: NEGATIVE MG/DL
HCT VFR BLD AUTO: 41.5 % (ref 36–52)
HDLC SERPL-MCNC: 36 MG/DL (ref 40–60)
HGB BLD-MCNC: 13.5 G/DL (ref 12–18)
HGB UR QL STRIP: NEGATIVE
LDLC SERPL CALC-MCNC: 45 MG/DL (ref 60–100)
LEUKOCYTE ESTERASE UR QL STRIP: NEGATIVE
LIPASE SERPL-CCNC: 183 U/L (ref 73–393)
LYMPHOCYTES # BLD AUTO: 2.2 K/UL (ref 2–11.5)
LYMPHOCYTES NFR BLD AUTO: 32.5 % (ref 20.5–51.1)
MAGNESIUM SERPL-MCNC: 2 MG/DL (ref 1.8–2.4)
MCH RBC QN AUTO: 26 PG (ref 27–31)
MCHC RBC AUTO-ENTMCNC: 33 G/DL (ref 33–37)
MCV RBC AUTO: 79.4 FL (ref 80–94)
MONOCYTES # BLD AUTO: 0.5 K/UL (ref 0.8–1)
MONOCYTES NFR BLD AUTO: 6.7 % (ref 1.7–9.3)
NEUTROPHILS # BLD AUTO: 4 K/UL (ref 1.8–7.7)
NEUTROPHILS NFR BLD AUTO: 59 % (ref 42.2–75.2)
NITRITE UR QL STRIP: NEGATIVE
OPIATES UR QL SCN: (no result) NG/ML
PCP UR QL SCN: (no result) NG/ML
PH UR STRIP: 5.5 [PH] (ref 5–9)
PHOSPHATE SERPL-MCNC: 4.5 MG/DL (ref 2.5–4.9)
PLATELET # BLD AUTO: 302 K/UL (ref 140–450)
POTASSIUM SERPL-SCNC: 3.8 MMOL/L (ref 3.5–5.1)
PROTHROMBIN TIME: 9.9 SECS (ref 10.8–13.4)
RBC # BLD AUTO: 5.23 MIL/UL (ref 4.2–6.1)
SODIUM SERPL-SCNC: 144 MMOL/L (ref 136–145)
T4 FREE SERPL-MCNC: 1.1 NG/DL (ref 0.76–1.46)
TRIGL SERPL-MCNC: 124 MG/DL (ref 30–150)
TSH SERPL DL<=0.05 MIU/L-ACNC: 2.95 UIU/ML (ref 0.34–3.74)
WBC # BLD AUTO: 6.8 K/UL (ref 4.8–10.8)

## 2019-09-10 RX ADMIN — FAMOTIDINE SCH MG: 20 TABLET ORAL at 09:44

## 2019-09-10 RX ADMIN — WATER SCH MG: 1 INJECTION INTRAMUSCULAR; INTRAVENOUS; SUBCUTANEOUS at 13:00

## 2019-09-10 RX ADMIN — IPRATROPIUM BROMIDE AND ALBUTEROL SULFATE SCH ML: .5; 3 SOLUTION RESPIRATORY (INHALATION) at 18:42

## 2019-09-10 RX ADMIN — IPRATROPIUM BROMIDE AND ALBUTEROL SULFATE SCH ML: .5; 3 SOLUTION RESPIRATORY (INHALATION) at 14:37

## 2019-09-10 RX ADMIN — WATER SCH MG: 1 INJECTION INTRAMUSCULAR; INTRAVENOUS; SUBCUTANEOUS at 21:34

## 2019-09-10 RX ADMIN — DOCUSATE SODIUM SCH MG: 100 CAPSULE, LIQUID FILLED ORAL at 09:44

## 2019-09-10 RX ADMIN — AZITHROMYCIN DIHYDRATE SCH MG: 250 TABLET, FILM COATED ORAL at 12:03

## 2019-09-10 RX ADMIN — DOCUSATE SODIUM SCH MG: 100 CAPSULE, LIQUID FILLED ORAL at 21:31

## 2019-09-10 RX ADMIN — HYDROCODONE BITARTRATE AND ACETAMINOPHEN PRN TAB: 7.5; 325 TABLET ORAL at 09:45

## 2019-09-10 RX ADMIN — NICOTINE SCH MG: 14 PATCH, EXTENDED RELEASE TOPICAL at 21:36

## 2019-09-10 SDOH — ECONOMIC STABILITY - HOUSING INSECURITY: HOMELESSNESS: Z59.0

## 2019-09-10 NOTE — NUR
ENDORSED PT TO PM RN PT AWAKE IN BED PT APPEARS STABLE AND IN NO APPARENT DISTRESS. ALL 
SAFETY MEASURES ARE IN PLACE,.

## 2019-09-10 NOTE — NUR
PT IN LOW BED WITH SIDE RAILS UP X2. PT IS AOX3 WITH NO S/S OF PAIN OR DISTRESS NOTED. V/S 
AS FOLLOWS T 97.7 P 60 R 18 B/P 121/76 02 92% ON ROOM AIR. PT IS SALINE LOCKED , NO C/O PAIN 
OR DISTRESS NOTED AL REQUESTED NEEDS ATTENDED.

## 2019-09-10 NOTE — NUR
PT ARRIVED ON THE UNIT AT 0725 RECEIVED HAND OFF REPORT FROM ER NURSE CARA PT IS AWAKE IN 
BED PT APPEARS STABLE AND IN NO APPARENT DISTRESS. PERFORMED HEAD TO TOE ASSESSMENT. 
COLLECTED URINE SPECIMEN AND MRSA NARES.

## 2019-09-10 NOTE — NUR
FREQUENT ROUNDING ON PT PT APPEARS STABLE AND IN NO APPARENT DISTRESS. ALL SAFETY MEASURES 
ARE IN PLACE.

## 2019-09-10 NOTE — NUR
FREQUENT ROUNDING ON  PT PT APPEARS STABLE AND IN NO APPARENT DISTRESS. ALL SAFETY MEASURES 
ARE IN  PLACE AND WILL CONTINUE TO MONITOR

## 2019-09-10 NOTE — NUR
PT CAME TO ER C/O SHORTNESS OF BREATH TODAY. PT RESPIRATIONS ARE LABORED. 
BREATH SOUNDS ARE BILATERAL WHEEZING THROUGHOUT. O2 SATURATION 94% ON ROOM AIR. 
PT DENIES PAIN, PAIN LEVEL 0/10. NKA. MED HX: A.FIB AND HTN. PT IS NONCOMPLIANT 
WITH MEDICATIONS. SAFETY MEASURES IN PLACE. PT PLACED IN HIGH MAJOR'S 
POSITION. ERMD MADE AWARE OF PT STATUS.

## 2019-09-10 NOTE — NUR
Patient will be admitted to care of Dr. Tavarez. Admited to tele.  Will go to 
room 106a. Belongings list completed.  Report to ROMEO Le.

## 2019-09-11 VITALS — SYSTOLIC BLOOD PRESSURE: 112 MMHG | DIASTOLIC BLOOD PRESSURE: 74 MMHG

## 2019-09-11 VITALS — DIASTOLIC BLOOD PRESSURE: 64 MMHG | SYSTOLIC BLOOD PRESSURE: 106 MMHG

## 2019-09-11 VITALS — SYSTOLIC BLOOD PRESSURE: 121 MMHG | DIASTOLIC BLOOD PRESSURE: 76 MMHG

## 2019-09-11 VITALS — DIASTOLIC BLOOD PRESSURE: 79 MMHG | SYSTOLIC BLOOD PRESSURE: 130 MMHG

## 2019-09-11 VITALS — DIASTOLIC BLOOD PRESSURE: 84 MMHG | SYSTOLIC BLOOD PRESSURE: 112 MMHG

## 2019-09-11 VITALS — DIASTOLIC BLOOD PRESSURE: 63 MMHG | SYSTOLIC BLOOD PRESSURE: 117 MMHG

## 2019-09-11 LAB
ANION GAP SERPL CALCULATED.3IONS-SCNC: 13.8 MMOL/L (ref 8–16)
BUN SERPL-MCNC: 26 MG/DL (ref 7–18)
CHLORIDE SERPL-SCNC: 105 MMOL/L (ref 98–107)
CO2 SERPL-SCNC: 25.9 MMOL/L (ref 21–32)
CREAT SERPL-MCNC: 1.1 MG/DL (ref 0.7–1.3)
ERYTHROCYTE [DISTWIDTH] IN BLOOD BY AUTOMATED COUNT: 17.9 % (ref 11.6–13.7)
GFR SERPL CREATININE-BSD FRML MDRD: 85 ML/MIN (ref 90–?)
GLUCOSE SERPL-MCNC: 148 MG/DL (ref 74–106)
HCT VFR BLD AUTO: 41 % (ref 36–52)
HGB BLD-MCNC: 13.2 G/DL (ref 12–18)
LYMPHOCYTES NFR BLD MANUAL: 6 % (ref 20–46)
MAGNESIUM SERPL-MCNC: 1.8 MG/DL (ref 1.8–2.4)
MCH RBC QN AUTO: 26 PG (ref 27–31)
MCHC RBC AUTO-ENTMCNC: 32 G/DL (ref 33–37)
MCV RBC AUTO: 79.5 FL (ref 80–94)
PHOSPHATE SERPL-MCNC: 4.6 MG/DL (ref 2.5–4.9)
PLATELET # BLD AUTO: 328 K/UL (ref 140–450)
POTASSIUM SERPL-SCNC: 3.7 MMOL/L (ref 3.5–5.1)
RBC # BLD AUTO: 5.16 MIL/UL (ref 4.2–6.1)
SODIUM SERPL-SCNC: 141 MMOL/L (ref 136–145)
WBC # BLD AUTO: 14.8 K/UL (ref 4.8–10.8)

## 2019-09-11 RX ADMIN — WATER SCH MG: 1 INJECTION INTRAMUSCULAR; INTRAVENOUS; SUBCUTANEOUS at 13:24

## 2019-09-11 RX ADMIN — IPRATROPIUM BROMIDE AND ALBUTEROL SULFATE SCH ML: .5; 3 SOLUTION RESPIRATORY (INHALATION) at 14:29

## 2019-09-11 RX ADMIN — Medication SCH MG: at 08:12

## 2019-09-11 RX ADMIN — IPRATROPIUM BROMIDE AND ALBUTEROL SULFATE SCH ML: .5; 3 SOLUTION RESPIRATORY (INHALATION) at 19:38

## 2019-09-11 RX ADMIN — LISINOPRIL SCH MG: 5 TABLET ORAL at 08:13

## 2019-09-11 RX ADMIN — IPRATROPIUM BROMIDE AND ALBUTEROL SULFATE SCH ML: .5; 3 SOLUTION RESPIRATORY (INHALATION) at 08:49

## 2019-09-11 RX ADMIN — HYDROCODONE BITARTRATE AND ACETAMINOPHEN PRN TAB: 7.5; 325 TABLET ORAL at 21:09

## 2019-09-11 RX ADMIN — AZITHROMYCIN DIHYDRATE SCH MG: 250 TABLET, FILM COATED ORAL at 11:17

## 2019-09-11 RX ADMIN — FUROSEMIDE SCH MG: 20 TABLET ORAL at 08:13

## 2019-09-11 RX ADMIN — FAMOTIDINE SCH MG: 20 TABLET ORAL at 08:13

## 2019-09-11 RX ADMIN — NICOTINE SCH MG: 14 PATCH, EXTENDED RELEASE TOPICAL at 08:44

## 2019-09-11 RX ADMIN — DOCUSATE SODIUM SCH MG: 100 CAPSULE, LIQUID FILLED ORAL at 08:12

## 2019-09-11 RX ADMIN — WATER SCH MG: 1 INJECTION INTRAMUSCULAR; INTRAVENOUS; SUBCUTANEOUS at 20:05

## 2019-09-11 RX ADMIN — DOCUSATE SODIUM SCH MG: 100 CAPSULE, LIQUID FILLED ORAL at 20:05

## 2019-09-11 NOTE — NUR
PT IN BED WATCHING TV, AAOX4. BREATHING EVEN AND UNLABORED. PT SHOWS NO SIGNS OF ACUTE 
DISTRESS AT THIS TIME. WILL CONTINUE TO ASSESS FOR CHANGES IN CONDITION.

## 2019-09-11 NOTE — NUR
RECEIVED PT ON BED WATCHING TV, AAOX4, ABLE TO MAKE NEEDS KNOWN, VITAL SIGNS TAKEN, 
UNCONTROLLED A-FIB/A-FLUTTER ON TELE, DENIES ANY PAIN, NO SOB NOTED, PLAN OF CARE DISCUSSED, 
SAFETY MEASURES IN PLACE, CALL LIGHT WITHIN REACH.

## 2019-09-11 NOTE — NUR
PT IN BED SLEEPING. NO SIGNS OF ACUTE DISTRESS AT THIS TIME. WILL CONTINUE TO ASSESS FOR 
CHANGES IN CONDITION.

## 2019-09-11 NOTE — NUR
PT ENDORSED TO NIGHT RNIGLESIA. PT IN BED, AAOX4. BREATHING EVEN AND UNLABORED. 22G IV TO R 
WRIST SALINE LOCK. VISITOR AT BEDSIDE. PT SHOWS NO SIGNS OF ACUTE DISTRESS AT THIS TIME.

## 2019-09-11 NOTE — NUR
DR WEILE CALLED TO ASK IF AZITHROMYCIN WAS OK TO ADMINISTER AFTER PT HAD 5-SEC V-TACH AT 
0937.  STATED IT WAS FINE BECAUSE MED CAN CAUSE Q-T PROLONGATION.

## 2019-09-11 NOTE — NUR
PT IN BED AAOX4. PT DENIES PAIN. NO SIGNS OF ACUTE DISTRESS AT THIS TIME. WILL CONTINUE TO 
ASSESS FOR CHANGES IN CONDITION.

## 2019-09-11 NOTE — NUR
PT IN BED WATCHING TV, AAOX4. PT SHOWS NO SIGNS OF ACUTE DISTRESS AT THIS TIME. WILL 
CONTINUE TO ASSESS FOR CHANGES IN CONDITION.

## 2019-09-11 NOTE — NUR
PT RECEIVED FROM NIGHT RNCHANELLE. PT SLEEPING IN BED, AROUSAL TO VERBAL COMMAND. 22 G IV TO 
R WRIST SALINE LOCK. PT SHOWS NO SIGNS OF ACUTE DISTRESS AT THIS TIME. WILL CONTINUE TO 
ASSESS FOR CHANGES IN CONDITION.

## 2019-09-11 NOTE — NUR
PT IN BED AAOX4. NO SIGNS OF ACUTE DISTRESS AT THIS TIME. WILL CONTINUE TO ASSESS FOR 
CHANGES IN CONDITION.

## 2019-09-11 NOTE — NUR
DUE MEDS GIVEN WITH EDUCATION PROVIDED, DR MCINTOSH HERE TO SEE PT, WITH NEW ORDER FOR SURGERY 
TOMORROW, INSTRUCTED NPO AFTER MIDNIGHT, VERBALIZED UNDERSTANDING.

## 2019-09-11 NOTE — NUR
PT HR-150-160'S ON THE TELEMETRY, SHOWING A-FLUTTER, PT SLEEPING, EASILY AROUSABLE, DENIES 
PAIN AND NO SOB NOTED, RADIAL PULSE PALPATED WITH 80-90 BPM, BP-118/58, SAT-95%, DR BRAR 
MADE AWARE AND CHECKED THE PT, WITH ORDER TO DO EKG, MONITORED PT CLOSELY,

## 2019-09-11 NOTE — NUR
PT IN BED IV SITE NOTED LEAKING AND PT SAID IT WAS PAINFUL. SITE REMOVED, NEW IV SITE 
PROVIDED ON R WRIST 22G AND FLUSHED PATENT. PT IS ZONIA LOCKED AT THIS TIME. PT DENIES PAIN 
AND V/S AS FOLLOWS T 97.7 P 60 R 18 B/P 121/76 02 92% ON ROOM AIR. BED LOW SIDE RAILS UP X2 
AND CALL BELL IN REACH.

## 2019-09-11 NOTE — NUR
PT RECEIVED ORDERED MEDS. AAOX4. PT RECEIVED TEACHING. PT SHOWS NO SIGNS OF ACUTE DISTRESS. 
WILL  CONTINUE TO MONITOR FOR CHANGES IN CONDITION.

## 2019-09-12 VITALS — DIASTOLIC BLOOD PRESSURE: 53 MMHG | SYSTOLIC BLOOD PRESSURE: 101 MMHG

## 2019-09-12 VITALS — SYSTOLIC BLOOD PRESSURE: 112 MMHG | DIASTOLIC BLOOD PRESSURE: 74 MMHG

## 2019-09-12 VITALS — SYSTOLIC BLOOD PRESSURE: 130 MMHG | DIASTOLIC BLOOD PRESSURE: 77 MMHG

## 2019-09-12 VITALS — DIASTOLIC BLOOD PRESSURE: 88 MMHG | SYSTOLIC BLOOD PRESSURE: 116 MMHG

## 2019-09-12 VITALS — SYSTOLIC BLOOD PRESSURE: 124 MMHG | DIASTOLIC BLOOD PRESSURE: 61 MMHG

## 2019-09-12 VITALS — SYSTOLIC BLOOD PRESSURE: 132 MMHG | DIASTOLIC BLOOD PRESSURE: 70 MMHG

## 2019-09-12 LAB
ANION GAP SERPL CALCULATED.3IONS-SCNC: 12.9 MMOL/L (ref 8–16)
BASOPHILS # BLD AUTO: 0 K/UL (ref 0–0.22)
BASOPHILS NFR BLD AUTO: 0 % (ref 0–2)
BUN SERPL-MCNC: 32 MG/DL (ref 7–18)
CHLORIDE SERPL-SCNC: 106 MMOL/L (ref 98–107)
CO2 SERPL-SCNC: 26.2 MMOL/L (ref 21–32)
CREAT SERPL-MCNC: 1.2 MG/DL (ref 0.7–1.3)
EOSINOPHIL # BLD AUTO: 0 K/UL (ref 0–0.4)
EOSINOPHIL NFR BLD AUTO: 0 % (ref 0–4)
ERYTHROCYTE [DISTWIDTH] IN BLOOD BY AUTOMATED COUNT: 18.1 % (ref 11.6–13.7)
GFR SERPL CREATININE-BSD FRML MDRD: 77 ML/MIN (ref 90–?)
GLUCOSE SERPL-MCNC: 136 MG/DL (ref 74–106)
HCT VFR BLD AUTO: 38.4 % (ref 36–52)
HGB BLD-MCNC: 12.3 G/DL (ref 12–18)
LYMPHOCYTES # BLD AUTO: 0.8 K/UL (ref 2–11.5)
LYMPHOCYTES NFR BLD AUTO: 4.8 % (ref 20.5–51.1)
MAGNESIUM SERPL-MCNC: 1.9 MG/DL (ref 1.8–2.4)
MCH RBC QN AUTO: 25 PG (ref 27–31)
MCHC RBC AUTO-ENTMCNC: 32 G/DL (ref 33–37)
MCV RBC AUTO: 79.2 FL (ref 80–94)
MONOCYTES # BLD AUTO: 0.3 K/UL (ref 0.8–1)
MONOCYTES NFR BLD AUTO: 1.6 % (ref 1.7–9.3)
NEUTROPHILS # BLD AUTO: 15.9 K/UL (ref 1.8–7.7)
NEUTROPHILS NFR BLD AUTO: 93.6 % (ref 42.2–75.2)
PHOSPHATE SERPL-MCNC: 4.2 MG/DL (ref 2.5–4.9)
PLATELET # BLD AUTO: 310 K/UL (ref 140–450)
POTASSIUM SERPL-SCNC: 4.1 MMOL/L (ref 3.5–5.1)
RBC # BLD AUTO: 4.85 MIL/UL (ref 4.2–6.1)
SODIUM SERPL-SCNC: 141 MMOL/L (ref 136–145)
WBC # BLD AUTO: 17 K/UL (ref 4.8–10.8)

## 2019-09-12 PROCEDURE — 0JBC0ZZ EXCISION OF PELVIC REGION SUBCUTANEOUS TISSUE AND FASCIA, OPEN APPROACH: ICD-10-PCS | Performed by: SURGERY

## 2019-09-12 PROCEDURE — 0WPF0JZ REMOVAL OF SYNTHETIC SUBSTITUTE FROM ABDOMINAL WALL, OPEN APPROACH: ICD-10-PCS | Performed by: SURGERY

## 2019-09-12 PROCEDURE — 0Y950ZZ DRAINAGE OF RIGHT INGUINAL REGION, OPEN APPROACH: ICD-10-PCS | Performed by: SURGERY

## 2019-09-12 RX ADMIN — WATER SCH MG: 1 INJECTION INTRAMUSCULAR; INTRAVENOUS; SUBCUTANEOUS at 04:43

## 2019-09-12 RX ADMIN — DOCUSATE SODIUM SCH MG: 100 CAPSULE, LIQUID FILLED ORAL at 20:24

## 2019-09-12 RX ADMIN — IPRATROPIUM BROMIDE AND ALBUTEROL SULFATE SCH ML: .5; 3 SOLUTION RESPIRATORY (INHALATION) at 20:07

## 2019-09-12 RX ADMIN — WATER SCH MG: 1 INJECTION INTRAMUSCULAR; INTRAVENOUS; SUBCUTANEOUS at 20:24

## 2019-09-12 RX ADMIN — FAMOTIDINE SCH MG: 20 TABLET ORAL at 08:50

## 2019-09-12 RX ADMIN — DEXTROSE SCH MLS/HR: 50 INJECTION, SOLUTION INTRAVENOUS at 23:33

## 2019-09-12 RX ADMIN — DOCUSATE SODIUM SCH MG: 100 CAPSULE, LIQUID FILLED ORAL at 09:00

## 2019-09-12 RX ADMIN — NICOTINE SCH MG: 14 PATCH, EXTENDED RELEASE TOPICAL at 08:50

## 2019-09-12 RX ADMIN — FUROSEMIDE SCH MG: 20 TABLET ORAL at 09:00

## 2019-09-12 RX ADMIN — LISINOPRIL SCH MG: 5 TABLET ORAL at 09:00

## 2019-09-12 RX ADMIN — AZITHROMYCIN DIHYDRATE SCH MG: 250 TABLET, FILM COATED ORAL at 12:41

## 2019-09-12 RX ADMIN — DEXTROSE SCH MLS/HR: 5 SOLUTION INTRAVENOUS at 15:09

## 2019-09-12 RX ADMIN — DEXTROSE SCH MLS/HR: 50 INJECTION, SOLUTION INTRAVENOUS at 12:41

## 2019-09-12 RX ADMIN — DEXTROSE SCH MLS/HR: 50 INJECTION, SOLUTION INTRAVENOUS at 18:03

## 2019-09-12 RX ADMIN — WATER SCH MG: 1 INJECTION INTRAMUSCULAR; INTRAVENOUS; SUBCUTANEOUS at 12:40

## 2019-09-12 RX ADMIN — Medication SCH MG: at 08:51

## 2019-09-12 RX ADMIN — IPRATROPIUM BROMIDE AND ALBUTEROL SULFATE SCH ML: .5; 3 SOLUTION RESPIRATORY (INHALATION) at 14:38

## 2019-09-12 RX ADMIN — IPRATROPIUM BROMIDE AND ALBUTEROL SULFATE SCH ML: .5; 3 SOLUTION RESPIRATORY (INHALATION) at 07:19

## 2019-09-12 NOTE — NUR
RECEIVED BEDSIDE REPORT FROM DAY SHIFT NURSE. PATIENT IS AWAKE, ALERT, AND COOPERATIVE. 
RESPIRATION EVEN UNLABORED ON ROOM AIR. NO DISTRESS NOTED. SKIN IS WARM AND DRY. IV PATENT 
AND INTACT. DENIES PAIN. FAMILY AT BEDSIDE. PLAN OF CARE WAS DISCUSSED. ALL SAFETY MEASURES 
IN PLACE. BED IS AT LOW POSITION. CALL LIGHT WITHIN REACH. WILL CONTINUE TO MONITOR.

## 2019-09-12 NOTE — NUR
RECEIVED PT AAOX4. NO SOB NOTED, ON ROOM AIR WITH SATS AT 92%. NO C/O PAIN AT THIS TIME. PT 
ON VARIABLE A-FLUTTER ON THE MONITOR, PT DENIES ANY SYMPTOMS, WILL CONTINUE TO MONITOR. IV 
TO RT HNAD PATENT AND INTACT. CHEST DIMINISHED AIR ENTRY TO THE BASES, ABDOMEN SOFT, 
DRESSING TO RT ABD DRY AND INTACT. PT KEPT NPO FOR PLANNED SURGICAL PROCEDURE AT 1100 HRS. 
PT VERBALIZED UNDERSTANDING.

## 2019-09-12 NOTE — NUR
PT BACK FROM SURGERY IN STABLE CONDITION, S/P REMOVAL OF INFECTED MESH OF RT INGUINAL HERNIA 
AND I&D 09/12/19, DRESSING CHANGED IN SURGERY BY DR MCINTOSH.

## 2019-09-12 NOTE — NUR
PT SITTING ON THE BEDSIDE CHAIR TALKING TO WIFE. NO SOB  NOTED.  NO COMPLAINTS MADE. WILL 
ENDORSE TO NEXT SHIFT NURSE FOR CONTINUITY OF CARE.

## 2019-09-12 NOTE — NUR
VITALS WERE TAKEN. PATIENT IN STABLE CONDITION. NO DISTRESS NOTED. CALL LIGHT WITHIN REACH. 
WILL CONTINUE TO MONITOR.

## 2019-09-12 NOTE — NUR
PT SLEEPING, EASILY AROUSABLE, VITAL SIGNS TAKEN, VARIABLE A-FLUTTER ON TELE, ASYMPTOMATIC, 
DENIES ANY PAIN, NO SOB NOTED, EKG DONE BY RT WOO, RESULT RELAYED TO DR GUTIERREZ, WITH 
NEW ORDER, NS 250ML BOLUS ADMINISTERED AS ORDERED, MONITORED CLOSELY.

## 2019-09-12 NOTE — NUR
PT SLEEPING, EASILY AROUSABLE, VITAL SIGNS TAKEN, BP STABLE, AFLUTTER ON TELE, ASYMPTOMATIC, 
CONTINUE TO MONITOR CLOSELY.

## 2019-09-12 NOTE — NUR
CHECKED PATIENT. PATIENT IN BED WATCHING TV RESPIRATION EVEN UNLABORED ON ROOM AIR. NO 
DISTRESS NOTED. CALL LIGHT WITHIN REACH. WILL CONTINUE TO MONITOR.

## 2019-09-12 NOTE — NUR
PT EATING DINNER. NO SOB NOTED. NO COMPLAINTS MADE. SMALL AMOUNT OF STRIKE THROUGH BLOOD 
NOTED ON PT'S RT INGUINAL AREA WOUND DRESSING. WILL CONTINUE TO MONITOR.

## 2019-09-12 NOTE — NUR
PT AWAKE, AMBULATED TO BR AND VOIDED FREELY, DENIES ANY PAIN, MAINTAINED ON NPO, FOR SURGERY 
TODAY AT 1100, MONITORED CLOSELY.

## 2019-09-12 NOTE — NUR
INITIAL ASSESSMENT DONE. VITALS WERE TAKEN. PATIENT IN STABLE CONDITION. NO DISTRESS NOTED. 
CALL LIGHT WITHIN REACH. WILL CONTINUE TO MONITOR.

## 2019-09-13 VITALS — SYSTOLIC BLOOD PRESSURE: 125 MMHG | DIASTOLIC BLOOD PRESSURE: 64 MMHG

## 2019-09-13 VITALS — DIASTOLIC BLOOD PRESSURE: 61 MMHG | SYSTOLIC BLOOD PRESSURE: 106 MMHG

## 2019-09-13 VITALS — SYSTOLIC BLOOD PRESSURE: 118 MMHG | DIASTOLIC BLOOD PRESSURE: 68 MMHG

## 2019-09-13 VITALS — DIASTOLIC BLOOD PRESSURE: 70 MMHG | SYSTOLIC BLOOD PRESSURE: 110 MMHG

## 2019-09-13 VITALS — DIASTOLIC BLOOD PRESSURE: 66 MMHG | SYSTOLIC BLOOD PRESSURE: 116 MMHG

## 2019-09-13 LAB
ANION GAP SERPL CALCULATED.3IONS-SCNC: 12.7 MMOL/L (ref 8–16)
BASOPHILS # BLD AUTO: 0 K/UL (ref 0–0.22)
BASOPHILS NFR BLD AUTO: 0.1 % (ref 0–2)
BUN SERPL-MCNC: 30 MG/DL (ref 7–18)
CHLORIDE SERPL-SCNC: 106 MMOL/L (ref 98–107)
CO2 SERPL-SCNC: 25.6 MMOL/L (ref 21–32)
CREAT SERPL-MCNC: 1.2 MG/DL (ref 0.7–1.3)
EOSINOPHIL # BLD AUTO: 0 K/UL (ref 0–0.4)
EOSINOPHIL NFR BLD AUTO: 0 % (ref 0–4)
ERYTHROCYTE [DISTWIDTH] IN BLOOD BY AUTOMATED COUNT: 18.1 % (ref 11.6–13.7)
GFR SERPL CREATININE-BSD FRML MDRD: 77 ML/MIN (ref 90–?)
GLUCOSE SERPL-MCNC: 146 MG/DL (ref 74–106)
HCT VFR BLD AUTO: 42 % (ref 36–52)
HGB BLD-MCNC: 13.5 G/DL (ref 12–18)
LYMPHOCYTES # BLD AUTO: 0.8 K/UL (ref 2–11.5)
LYMPHOCYTES NFR BLD AUTO: 5.6 % (ref 20.5–51.1)
MAGNESIUM SERPL-MCNC: 2 MG/DL (ref 1.8–2.4)
MCH RBC QN AUTO: 26 PG (ref 27–31)
MCHC RBC AUTO-ENTMCNC: 32 G/DL (ref 33–37)
MCV RBC AUTO: 79.7 FL (ref 80–94)
MONOCYTES # BLD AUTO: 0.6 K/UL (ref 0.8–1)
MONOCYTES NFR BLD AUTO: 3.7 % (ref 1.7–9.3)
NEUTROPHILS # BLD AUTO: 13.6 K/UL (ref 1.8–7.7)
NEUTROPHILS NFR BLD AUTO: 90.6 % (ref 42.2–75.2)
PHOSPHATE SERPL-MCNC: 3.5 MG/DL (ref 2.5–4.9)
PLATELET # BLD AUTO: 322 K/UL (ref 140–450)
POTASSIUM SERPL-SCNC: 4.3 MMOL/L (ref 3.5–5.1)
RBC # BLD AUTO: 5.27 MIL/UL (ref 4.2–6.1)
SODIUM SERPL-SCNC: 140 MMOL/L (ref 136–145)
WBC # BLD AUTO: 15.1 K/UL (ref 4.8–10.8)

## 2019-09-13 RX ADMIN — IPRATROPIUM BROMIDE AND ALBUTEROL SULFATE SCH ML: .5; 3 SOLUTION RESPIRATORY (INHALATION) at 13:28

## 2019-09-13 RX ADMIN — FAMOTIDINE SCH MG: 20 TABLET ORAL at 09:14

## 2019-09-13 RX ADMIN — WATER SCH MG: 1 INJECTION INTRAMUSCULAR; INTRAVENOUS; SUBCUTANEOUS at 04:26

## 2019-09-13 RX ADMIN — DOCUSATE SODIUM SCH MG: 100 CAPSULE, LIQUID FILLED ORAL at 09:14

## 2019-09-13 RX ADMIN — Medication SCH MG: at 09:23

## 2019-09-13 RX ADMIN — LISINOPRIL SCH MG: 5 TABLET ORAL at 09:14

## 2019-09-13 RX ADMIN — NICOTINE SCH MG: 14 PATCH, EXTENDED RELEASE TOPICAL at 09:23

## 2019-09-13 RX ADMIN — DEXTROSE SCH MLS/HR: 5 SOLUTION INTRAVENOUS at 07:37

## 2019-09-13 RX ADMIN — DEXTROSE SCH MLS/HR: 50 INJECTION, SOLUTION INTRAVENOUS at 05:04

## 2019-09-13 RX ADMIN — FUROSEMIDE SCH MG: 20 TABLET ORAL at 09:15

## 2019-09-13 RX ADMIN — AZITHROMYCIN DIHYDRATE SCH MG: 250 TABLET, FILM COATED ORAL at 11:05

## 2019-09-13 RX ADMIN — DEXTROSE SCH MLS/HR: 50 INJECTION, SOLUTION INTRAVENOUS at 11:05

## 2019-09-13 RX ADMIN — IPRATROPIUM BROMIDE AND ALBUTEROL SULFATE SCH ML: .5; 3 SOLUTION RESPIRATORY (INHALATION) at 08:47

## 2019-09-13 NOTE — NUR
PT IN BED WATCHING TV. AAOX4. BREATHING EVEN AND UNLABORED. NO SIGNS OF ACUTE DISTRESS AT 
THIS TIME. WILL CONTINUE TO ASSESS FOR CHANGES IN CONDITION.

## 2019-09-13 NOTE — NUR
RECEIVED A CALL FROM BECKY  SPOKE WITH MARY PT IS GOING TO [facility] ROOM 100-2 # TO 
GIVE REPORT  PER MARY ARRANGED TRANSPORT WITH [facility]  TIME 1831

## 2019-09-13 NOTE — NUR
PT RECEIVED FROM NIGHT RNRAY. PT IN BED AAOX4. NO SIGNS OF ACUTE DISTRESS AT THIS TIME. 
WILL CONTINUE TO ASSESS FOR CHANGES IN CONDITION.

## 2019-09-13 NOTE — NUR
PT STATED HE DID NOT WANT TO GO TO [facility]. HE STATED HE JUST WANTED TO GO HOME. PT 
RECEIVED EDUCATION ABOUT LEAVING AMA AND HOW NOT CONTINUING TREATMENT COULD RESULT IN 
WORSENING INFECTION, UP TO AND INCLUDING DEATH. PT STATED "I DON'T LIKE THE WAY YOU DO 
BUSINESS HERE. I'M GOING HOME." PT SIGNED AMA FORM. DR. WEILE NOTIFIED VIA PHONE. PT 22 G IV 
REMOVED FROM L HAND, CATHETER INTACT. PT TOOK ALL BELONGINGS WITH HIM. T-SHIRT WAS PROVIDED 
FOR PATIENT BECAUSE HE DID NOT HAVE ONE. PT STATED HE WILL WAIT FOR WIFE IN [place].

## 2019-09-13 NOTE — NUR
CALLED [facility] AND CANCELLED . [facility].

## 2019-09-13 NOTE — NUR
CONTACTED BECKY SPARKS [phone] REGARDING ORDER FOR SNF PLACEMENT FOR PT AND CONTINUED 
ANTIBIOTIC, ABLE TO SPEAK TO INOCENCIA.  SHE PROVIDED ME WITH PAWEL KRISHNA [phone].  
CONTACTED THE PROVIDED NUMBER, NO ANSWER.  LEFT MESSAGE.

## 2019-09-13 NOTE — NUR
PT IN BED WATCHING TV. NO SIGNS OF ACUTE DISTRESS AT THIS TIME. WILL CONTINUE TO ASSESS FOR 
CHANGES IN CONDITION

## 2019-09-13 NOTE — NUR
RECEIVED A CALL FROM [facility], PROVIDED ME WITH MARY KRISHNA NUMBER [phone].  
CONTACTED THE PROVIDED NUMBER, ABLE TO SPEAK TO MARY, SHE PROVIDED ME WITH FAX NUMBER 
[phone] TO SEND ORDER AND CLINICALS.  INFORMATION NEEDED SENT TO THE PROVIDED FAX 
NUMBER.  TOMI WILL FOLLOW UP.

## 2019-09-13 NOTE — NUR
CHECKED PATIENT. PATIENT SLEEPING RESPIRATION EVEN UNLABORED ON ROOM AIR. NO DISTRESS NOTED. 
CALL LIGHT WITHIN REACH. WILL CONTINUE TO MONITOR.

## 2019-09-13 NOTE — NUR
PATIENT WOKE UP AND REQUESTING FOR SANDWICHES AND JUICES. SANDWICH PROVIDED. WILL CONTINUE 
TO MONITOR.

## 2019-09-13 NOTE — NUR
PT IN BED SLEEPING. AWOKE TO VERBAL STIMULI. NO SIGNS OF ACUTE DISTRESS. BREATHING EVEN AND 
UNLABORED. WILL CONTINUE TO ASSESS FOR CHANGES IN CONDITION.

## 2019-09-13 NOTE — NUR
MONITOR TECH NOTIFIED THAT PT HR WAS ELEVATED. UPON ENTERING ROOM, PT WAS WALKING BACK FROM 
RESTROOM. PT ASYMPTOMATIC. PT HR RETURNED TO BASELINE AFTER SITTING. WILL CONTINUE TO ASSESS 
FOR CHANGES IN CONDITION.

## 2019-09-13 NOTE — NUR
SURGICAL INCISION PHOTOGRAPHED FOR DISCHARGE. PT HAS 10 CM INCISION TO R LOWER ABDOMEN, WELL 
APPROXIMATED, 11 STAPLES. NO DRAINAGE OBSERVED. DRESSING INTACT WITH SEROSANGUINEOUS 
DRAINAGE. PT DENIED PAIN. NO REDNESS.

## 2019-09-13 NOTE — NUR
9/13/19 RD INITIAL ASSESSMENT COMPLETED



PLEASE REFER TO NUTRITION ASSESSMENT UNDER CARE ACTIVITY FOR ESTIMATED NUTRITIONAL NEEDS. 



1. CONTINUE CARDIAC DIET AS TOLERATED 

2. RD PROVIDED NUTRITION EDUCATION HANDOUT FOR CHF

3. RD TO FOLLOW-UP 5-7 DAYS, LOW RISK 



AMALIA YU RD

## 2019-11-20 ENCOUNTER — HOSPITAL ENCOUNTER (INPATIENT)
Dept: HOSPITAL 26 - MED | Age: 69
Discharge: LEFT BEFORE DISCHARGE | DRG: 308 | End: 2019-11-20
Attending: GENERAL PRACTICE | Admitting: GENERAL PRACTICE
Payer: COMMERCIAL

## 2019-11-20 VITALS — SYSTOLIC BLOOD PRESSURE: 132 MMHG | DIASTOLIC BLOOD PRESSURE: 91 MMHG

## 2019-11-20 VITALS — DIASTOLIC BLOOD PRESSURE: 106 MMHG | SYSTOLIC BLOOD PRESSURE: 138 MMHG

## 2019-11-20 VITALS — HEIGHT: 68 IN | BODY MASS INDEX: 25.76 KG/M2 | WEIGHT: 170 LBS

## 2019-11-20 DIAGNOSIS — I48.91: Primary | ICD-10-CM

## 2019-11-20 DIAGNOSIS — Z53.29: ICD-10-CM

## 2019-11-20 DIAGNOSIS — I11.0: ICD-10-CM

## 2019-11-20 DIAGNOSIS — Z87.891: ICD-10-CM

## 2019-11-20 DIAGNOSIS — J44.9: ICD-10-CM

## 2019-11-20 DIAGNOSIS — N39.0: ICD-10-CM

## 2019-11-20 DIAGNOSIS — Z91.14: ICD-10-CM

## 2019-11-20 DIAGNOSIS — I50.43: ICD-10-CM

## 2019-11-20 LAB
ALBUMIN FLD-MCNC: 2.9 G/DL (ref 3.4–5)
AMYLASE SERPL-CCNC: 34 U/L (ref 25–115)
ANION GAP SERPL CALCULATED.3IONS-SCNC: 16.5 MMOL/L (ref 8–16)
APPEARANCE UR: (no result)
AST SERPL-CCNC: 35 U/L (ref 15–37)
BASOPHILS # BLD AUTO: 0 K/UL (ref 0–0.22)
BASOPHILS NFR BLD AUTO: 0.7 % (ref 0–2)
BILIRUB SERPL-MCNC: 2.2 MG/DL (ref 0–1)
BILIRUB UR QL STRIP: (no result)
BUN SERPL-MCNC: 21 MG/DL (ref 7–18)
CAOX CRY URNS QL MICRO: (no result) /HPF
CHLORIDE SERPL-SCNC: 106 MMOL/L (ref 98–107)
CO2 SERPL-SCNC: 23.6 MMOL/L (ref 21–32)
COLOR UR: YELLOW
CREAT SERPL-MCNC: 1 MG/DL (ref 0.7–1.3)
EOSINOPHIL # BLD AUTO: 0.1 K/UL (ref 0–0.4)
EOSINOPHIL NFR BLD AUTO: 1.2 % (ref 0–4)
ERYTHROCYTE [DISTWIDTH] IN BLOOD BY AUTOMATED COUNT: 18.7 % (ref 11.6–13.7)
GFR SERPL CREATININE-BSD FRML MDRD: 95 ML/MIN (ref 90–?)
GLUCOSE SERPL-MCNC: 105 MG/DL (ref 74–106)
GLUCOSE UR STRIP-MCNC: NEGATIVE MG/DL
HCT VFR BLD AUTO: 38.1 % (ref 36–52)
HGB BLD-MCNC: 12.2 G/DL (ref 12–18)
HGB UR QL STRIP: (no result)
LEUKOCYTE ESTERASE UR QL STRIP: (no result)
LIPASE SERPL-CCNC: 128 U/L (ref 73–393)
LYMPHOCYTES # BLD AUTO: 1.7 K/UL (ref 2–11.5)
LYMPHOCYTES NFR BLD AUTO: 26 % (ref 20.5–51.1)
MAGNESIUM SERPL-MCNC: 1.9 MG/DL (ref 1.8–2.4)
MCH RBC QN AUTO: 26 PG (ref 27–31)
MCHC RBC AUTO-ENTMCNC: 32 G/DL (ref 33–37)
MCV RBC AUTO: 80.1 FL (ref 80–94)
MONOCYTES # BLD AUTO: 0.6 K/UL (ref 0.8–1)
MONOCYTES NFR BLD AUTO: 8.4 % (ref 1.7–9.3)
NEUTROPHILS # BLD AUTO: 4.2 K/UL (ref 1.8–7.7)
NEUTROPHILS NFR BLD AUTO: 63.7 % (ref 42.2–75.2)
NITRITE UR QL STRIP: NEGATIVE
PH UR STRIP: 5.5 [PH] (ref 5–9)
PHOSPHATE SERPL-MCNC: 3.9 MG/DL (ref 2.5–4.9)
PLATELET # BLD AUTO: 357 K/UL (ref 140–450)
POTASSIUM SERPL-SCNC: 4.1 MMOL/L (ref 3.5–5.1)
PROTHROMBIN TIME: 11 SECS (ref 10.8–13.4)
RBC # BLD AUTO: 4.75 MIL/UL (ref 4.2–6.1)
RBC #/AREA URNS HPF: (no result) /HPF (ref 0–5)
SODIUM SERPL-SCNC: 142 MMOL/L (ref 136–145)
T4 FREE SERPL-MCNC: 1.33 NG/DL (ref 0.76–1.46)
TSH SERPL DL<=0.05 MIU/L-ACNC: 2.7 UIU/ML (ref 0.34–3.74)
WBC # BLD AUTO: 6.6 K/UL (ref 4.8–10.8)
WBC,URINE: (no result) /HPF (ref 0–5)

## 2019-11-20 NOTE — NUR
RECEIVED REPORT FROM ED NURSE ADEBAYO. PT IS AAOX4, COOPERATIVE BUT SHOWING SIGNS OF ANGER. PT 
SKIN IS INTACT. PT ON 3L VIA N/C. PT AMBULATES WELL. PT ON HEPRIN DRIP PER PROTOCOL. 
EXPLAINED POC TO PT AND PT VERBALIZED UNDERSTANDING. ALL NEEDS MET. WILL ROUND FREQUENTLY ON 
PT. BED IN LOW POSITION, CALL LIGHT WITHIN  REACH. 

-------------------------------------------------------------------------------

Addendum: 11/20/19 at 1226 by Amara Vargas RN

-------------------------------------------------------------------------------

PT ADMITTED AT 0915.

## 2019-11-20 NOTE — NUR
PATIENT'S VSS. BREATHING IS UNLABORED AT THIS TIME: 22 RR; SPO2 96% ON 2L NASAL 
CANNULA. WIFE AT BEDSIDE. WILL CONTINUE TO MONITOR.

## 2019-11-20 NOTE — NUR
PATIENT'S BREATHING IS UNLABORED AND SYMMETRICAL. PATIENT IS ASLEEP AT THIS 
TIME. VSS. WIFE AT BEDSIDE. WILL CONTINUE TO MONITOR.

## 2019-11-20 NOTE — NUR
Patient will be admitted to care of DR. VELASQUEZ. Admited to TELEMETRY.  Will go 
to room 107-B. Belongings list completed.  Report to ROMEO HERNANDEZ.

## 2019-11-20 NOTE — NUR
69 Y/O MALE BIB WIFE C/O SHORTNESS OF BREATH X 3 DAYS. RR 32 ON RA. 95% SPO2. 
BREATHING IS LABORED AND TACHYPNEIC. LUNG SOUNDS CLEAR/DIMINSHED ON BILATERAL 
LUNG BASES (ANTERIORALLY/POSTERIORALLY). SKIN IS DRY AND INTACT. DENIES 
N/V/D/FEVER/CHILLS/CHEST PAIN. PAIN IS A 0/10. ERMD MADE AWARE OF STATUS. SIDE 
RAILSX1. PLACED ON MONITOR. WILL CONTINUE TO MONITOR.



PMH:HTN; ATRIAL FIBRILLATION; SMOKER 1 PACK FOR 43 YEARS

RX: LISINOPRIL; FUROSEMIDE; DIGOXIN; ASPIRIN; POTASSIUN

NKDA

## 2019-11-20 NOTE — NUR
VITAL SIGNS STABLE. PT IS SLEEPING AT THIS TIME; BREATHING UNLABORED AND 
SYMMETRICAL. 99% ON 2L. WILL CONTINUE TO MONITOR.

## 2019-11-20 NOTE — NUR
PT LEFT AMA. RESIDENT  AWARE AND SPOKE WITH PT ABOUT RISKS OF LEAVING AMA. PT VERBALIZED 
UNDERSTANDING BUT INSISTED ON LEAVING. IVS REMOVED WITH TIP INTACT. PRESSURE HELD ON IV 
SITES FOR 5MINS UNTIL BLEEDING STOPPED. EDUCATED PT ON WATCHING FOR BLEEDING AND TO BE 
CAREFUL WITH NOT CUTTING HIMSELF OR BRUSHING TEETH TOO HARD. ALSO EDUCATED PT ON SHAVING AND 
TO BE VERY CAREFUL. PT DECIDED TO WALK HOME. PT REFUSED MRSA SWAB TO BE DONE. PT LEFT IN 
STABLE CONDITION.

## 2019-11-24 ENCOUNTER — HOSPITAL ENCOUNTER (EMERGENCY)
Dept: HOSPITAL 26 - MED | Age: 69
LOS: 1 days | Discharge: ANOTHER HOSPITAL | End: 2019-11-25
Payer: COMMERCIAL

## 2019-11-24 VITALS
WEIGHT: 178 LBS | BODY MASS INDEX: 26.98 KG/M2 | HEIGHT: 68 IN | DIASTOLIC BLOOD PRESSURE: 119 MMHG | SYSTOLIC BLOOD PRESSURE: 161 MMHG

## 2019-11-24 DIAGNOSIS — I21.4: Primary | ICD-10-CM

## 2019-11-24 DIAGNOSIS — I50.9: ICD-10-CM

## 2019-11-24 DIAGNOSIS — Z90.49: ICD-10-CM

## 2019-11-24 DIAGNOSIS — I48.20: ICD-10-CM

## 2019-11-24 DIAGNOSIS — F17.210: ICD-10-CM

## 2019-11-24 DIAGNOSIS — I11.0: ICD-10-CM

## 2019-11-24 DIAGNOSIS — Z71.6: ICD-10-CM

## 2019-11-24 LAB
ALBUMIN FLD-MCNC: 3 G/DL (ref 3.4–5)
ANION GAP SERPL CALCULATED.3IONS-SCNC: 15.6 MMOL/L (ref 8–16)
AST SERPL-CCNC: 29 U/L (ref 15–37)
BASOPHILS # BLD AUTO: 0 K/UL (ref 0–0.22)
BASOPHILS NFR BLD AUTO: 0.5 % (ref 0–2)
BILIRUB SERPL-MCNC: 1.7 MG/DL (ref 0–1)
BUN SERPL-MCNC: 23 MG/DL (ref 7–18)
CHLORIDE SERPL-SCNC: 108 MMOL/L (ref 98–107)
CHOLEST/HDLC SERPL: 3.4 {RATIO} (ref 1–4.5)
CK MB SERPL-MCNC: 2.6 NG/ML (ref 0–3.6)
CO2 SERPL-SCNC: 25.7 MMOL/L (ref 21–32)
CREAT SERPL-MCNC: 1.1 MG/DL (ref 0.7–1.3)
EOSINOPHIL # BLD AUTO: 0.1 K/UL (ref 0–0.4)
EOSINOPHIL NFR BLD AUTO: 1.4 % (ref 0–4)
ERYTHROCYTE [DISTWIDTH] IN BLOOD BY AUTOMATED COUNT: 18.7 % (ref 11.6–13.7)
GFR SERPL CREATININE-BSD FRML MDRD: 85 ML/MIN (ref 90–?)
GLUCOSE SERPL-MCNC: 103 MG/DL (ref 74–106)
HCT VFR BLD AUTO: 38.6 % (ref 36–52)
HDLC SERPL-MCNC: 28 MG/DL (ref 40–60)
HGB BLD-MCNC: 12.3 G/DL (ref 12–18)
LDLC SERPL CALC-MCNC: 53 MG/DL (ref 60–100)
LYMPHOCYTES # BLD AUTO: 1.9 K/UL (ref 2–11.5)
LYMPHOCYTES NFR BLD AUTO: 25.3 % (ref 20.5–51.1)
MCH RBC QN AUTO: 25 PG (ref 27–31)
MCHC RBC AUTO-ENTMCNC: 32 G/DL (ref 33–37)
MCV RBC AUTO: 79.4 FL (ref 80–94)
MONOCYTES # BLD AUTO: 0.5 K/UL (ref 0.8–1)
MONOCYTES NFR BLD AUTO: 6.4 % (ref 1.7–9.3)
NEUTROPHILS # BLD AUTO: 4.9 K/UL (ref 1.8–7.7)
NEUTROPHILS NFR BLD AUTO: 66.4 % (ref 42.2–75.2)
PLATELET # BLD AUTO: 380 K/UL (ref 140–450)
POTASSIUM SERPL-SCNC: 4.3 MMOL/L (ref 3.5–5.1)
PROTHROMBIN TIME: 11 SECS (ref 10.8–13.4)
RBC # BLD AUTO: 4.86 MIL/UL (ref 4.2–6.1)
SODIUM SERPL-SCNC: 145 MMOL/L (ref 136–145)
TRIGL SERPL-MCNC: 72 MG/DL (ref 30–150)
WBC # BLD AUTO: 7.3 K/UL (ref 4.8–10.8)

## 2019-11-24 PROCEDURE — 82553 CREATINE MB FRACTION: CPT

## 2019-11-24 PROCEDURE — 84484 ASSAY OF TROPONIN QUANT: CPT

## 2019-11-24 PROCEDURE — 96375 TX/PRO/DX INJ NEW DRUG ADDON: CPT

## 2019-11-24 PROCEDURE — 80061 LIPID PANEL: CPT

## 2019-11-24 PROCEDURE — 83880 ASSAY OF NATRIURETIC PEPTIDE: CPT

## 2019-11-24 PROCEDURE — 71045 X-RAY EXAM CHEST 1 VIEW: CPT

## 2019-11-24 PROCEDURE — 85730 THROMBOPLASTIN TIME PARTIAL: CPT

## 2019-11-24 PROCEDURE — 96365 THER/PROPH/DIAG IV INF INIT: CPT

## 2019-11-24 PROCEDURE — 36415 COLL VENOUS BLD VENIPUNCTURE: CPT

## 2019-11-24 PROCEDURE — 96366 THER/PROPH/DIAG IV INF ADDON: CPT

## 2019-11-24 PROCEDURE — 85025 COMPLETE CBC W/AUTO DIFF WBC: CPT

## 2019-11-24 PROCEDURE — 85379 FIBRIN DEGRADATION QUANT: CPT

## 2019-11-24 PROCEDURE — 80053 COMPREHEN METABOLIC PANEL: CPT

## 2019-11-24 PROCEDURE — 85610 PROTHROMBIN TIME: CPT

## 2019-11-24 PROCEDURE — 99284 EMERGENCY DEPT VISIT MOD MDM: CPT

## 2019-11-24 PROCEDURE — 82550 ASSAY OF CK (CPK): CPT

## 2019-11-25 VITALS — SYSTOLIC BLOOD PRESSURE: 124 MMHG | DIASTOLIC BLOOD PRESSURE: 88 MMHG

## 2021-05-14 ENCOUNTER — HOSPITAL ENCOUNTER (INPATIENT)
Dept: HOSPITAL 26 - MED | Age: 71
LOS: 5 days | Discharge: HOME | DRG: 871 | End: 2021-05-19
Attending: STUDENT IN AN ORGANIZED HEALTH CARE EDUCATION/TRAINING PROGRAM | Admitting: STUDENT IN AN ORGANIZED HEALTH CARE EDUCATION/TRAINING PROGRAM
Payer: COMMERCIAL

## 2021-05-14 VITALS — SYSTOLIC BLOOD PRESSURE: 154 MMHG | DIASTOLIC BLOOD PRESSURE: 101 MMHG

## 2021-05-14 VITALS — HEIGHT: 68 IN | WEIGHT: 169 LBS | BODY MASS INDEX: 25.61 KG/M2

## 2021-05-14 VITALS — DIASTOLIC BLOOD PRESSURE: 101 MMHG | SYSTOLIC BLOOD PRESSURE: 134 MMHG

## 2021-05-14 VITALS — SYSTOLIC BLOOD PRESSURE: 137 MMHG | DIASTOLIC BLOOD PRESSURE: 94 MMHG

## 2021-05-14 VITALS — DIASTOLIC BLOOD PRESSURE: 93 MMHG | SYSTOLIC BLOOD PRESSURE: 140 MMHG

## 2021-05-14 DIAGNOSIS — A41.9: Primary | ICD-10-CM

## 2021-05-14 DIAGNOSIS — J43.9: ICD-10-CM

## 2021-05-14 DIAGNOSIS — E87.6: ICD-10-CM

## 2021-05-14 DIAGNOSIS — E83.51: ICD-10-CM

## 2021-05-14 DIAGNOSIS — I11.0: ICD-10-CM

## 2021-05-14 DIAGNOSIS — N17.0: ICD-10-CM

## 2021-05-14 DIAGNOSIS — I26.99: ICD-10-CM

## 2021-05-14 DIAGNOSIS — N39.0: ICD-10-CM

## 2021-05-14 DIAGNOSIS — F15.90: ICD-10-CM

## 2021-05-14 DIAGNOSIS — I27.20: ICD-10-CM

## 2021-05-14 DIAGNOSIS — E86.0: ICD-10-CM

## 2021-05-14 DIAGNOSIS — I48.0: ICD-10-CM

## 2021-05-14 DIAGNOSIS — K57.90: ICD-10-CM

## 2021-05-14 DIAGNOSIS — I42.9: ICD-10-CM

## 2021-05-14 DIAGNOSIS — E80.6: ICD-10-CM

## 2021-05-14 DIAGNOSIS — I50.43: ICD-10-CM

## 2021-05-14 DIAGNOSIS — E83.42: ICD-10-CM

## 2021-05-14 DIAGNOSIS — K44.9: ICD-10-CM

## 2021-05-14 DIAGNOSIS — E46: ICD-10-CM

## 2021-05-14 DIAGNOSIS — Z20.822: ICD-10-CM

## 2021-05-14 DIAGNOSIS — J96.01: ICD-10-CM

## 2021-05-14 DIAGNOSIS — R59.1: ICD-10-CM

## 2021-05-14 LAB
ALBUMIN FLD-MCNC: 3.3 G/DL (ref 3.4–5)
ALBUMIN FLD-MCNC: 3.6 G/DL (ref 3.4–5)
AMYLASE SERPL-CCNC: 27 U/L (ref 25–115)
ANION GAP SERPL CALCULATED.3IONS-SCNC: 10.7 MMOL/L (ref 8–16)
ANION GAP SERPL CALCULATED.3IONS-SCNC: 15 MMOL/L (ref 8–16)
APPEARANCE UR: CLEAR
AST SERPL-CCNC: 27 U/L (ref 15–37)
AST SERPL-CCNC: 28 U/L (ref 15–37)
BARBITURATES UR QL SCN: NEGATIVE NG/ML
BASOPHILS # BLD AUTO: 0 K/UL (ref 0–0.22)
BASOPHILS # BLD AUTO: 0 K/UL (ref 0–0.22)
BASOPHILS NFR BLD AUTO: 0.2 % (ref 0–2)
BASOPHILS NFR BLD AUTO: 0.4 % (ref 0–2)
BENZODIAZ UR QL SCN: NEGATIVE NG/ML
BILIRUB SERPL-MCNC: 1.8 MG/DL (ref 0–1)
BILIRUB SERPL-MCNC: 2 MG/DL (ref 0–1)
BILIRUB UR QL STRIP: NEGATIVE
BUN SERPL-MCNC: 16 MG/DL (ref 7–18)
BUN SERPL-MCNC: 22 MG/DL (ref 7–18)
BZE UR QL SCN: NEGATIVE NG/ML
CANNABINOIDS UR QL SCN: NEGATIVE NG/ML
CHLORIDE SERPL-SCNC: 105 MMOL/L (ref 98–107)
CHLORIDE SERPL-SCNC: 105 MMOL/L (ref 98–107)
CHOLEST/HDLC SERPL: 3.1 {RATIO} (ref 1–4.5)
CO2 SERPL-SCNC: 25.9 MMOL/L (ref 21–32)
CO2 SERPL-SCNC: 26.1 MMOL/L (ref 21–32)
COLOR UR: YELLOW
CREAT SERPL-MCNC: 0.9 MG/DL (ref 0.6–1.3)
CREAT SERPL-MCNC: 1.3 MG/DL (ref 0.6–1.3)
EOSINOPHIL # BLD AUTO: 0 K/UL (ref 0–0.4)
EOSINOPHIL # BLD AUTO: 0 K/UL (ref 0–0.4)
EOSINOPHIL NFR BLD AUTO: 0 % (ref 0–4)
EOSINOPHIL NFR BLD AUTO: 0.1 % (ref 0–4)
ERYTHROCYTE [DISTWIDTH] IN BLOOD BY AUTOMATED COUNT: 13 % (ref 11.6–13.7)
ERYTHROCYTE [DISTWIDTH] IN BLOOD BY AUTOMATED COUNT: 13.1 % (ref 11.6–13.7)
GFR SERPL CREATININE-BSD FRML MDRD: (no result) ML/MIN (ref 90–?)
GFR SERPL CREATININE-BSD FRML MDRD: (no result) ML/MIN (ref 90–?)
GLUCOSE SERPL-MCNC: 137 MG/DL (ref 74–106)
GLUCOSE SERPL-MCNC: 206 MG/DL (ref 74–106)
GLUCOSE UR STRIP-MCNC: (no result) MG/DL
HCT VFR BLD AUTO: 45.2 % (ref 36–52)
HCT VFR BLD AUTO: 47.1 % (ref 36–52)
HDLC SERPL-MCNC: 37 MG/DL (ref 40–60)
HGB BLD-MCNC: 15.2 G/DL (ref 12–18)
HGB BLD-MCNC: 15.7 G/DL (ref 12–18)
HGB UR QL STRIP: (no result)
LDLC SERPL CALC-MCNC: 69 MG/DL (ref 60–100)
LEUKOCYTE ESTERASE UR QL STRIP: NEGATIVE
LIPASE SERPL-CCNC: 43 U/L (ref 73–393)
LYMPHOCYTES # BLD AUTO: 1.3 K/UL (ref 2–11.5)
LYMPHOCYTES # BLD AUTO: 1.4 K/UL (ref 2–11.5)
LYMPHOCYTES NFR BLD AUTO: 11.5 % (ref 20.5–51.1)
LYMPHOCYTES NFR BLD AUTO: 12.9 % (ref 20.5–51.1)
MAGNESIUM SERPL-MCNC: 1.7 MG/DL (ref 1.8–2.4)
MCH RBC QN AUTO: 32 PG (ref 27–31)
MCH RBC QN AUTO: 32 PG (ref 27–31)
MCHC RBC AUTO-ENTMCNC: 33 G/DL (ref 33–37)
MCHC RBC AUTO-ENTMCNC: 34 G/DL (ref 33–37)
MCV RBC AUTO: 95.3 FL (ref 80–94)
MCV RBC AUTO: 95.8 FL (ref 80–94)
MONOCYTES # BLD AUTO: 0.5 K/UL (ref 0.8–1)
MONOCYTES # BLD AUTO: 0.7 K/UL (ref 0.8–1)
MONOCYTES NFR BLD AUTO: 4.8 % (ref 1.7–9.3)
MONOCYTES NFR BLD AUTO: 5.9 % (ref 1.7–9.3)
NEUTROPHILS # BLD AUTO: 9 K/UL (ref 1.8–7.7)
NEUTROPHILS # BLD AUTO: 9.5 K/UL (ref 1.8–7.7)
NEUTROPHILS NFR BLD AUTO: 80.9 % (ref 42.2–75.2)
NEUTROPHILS NFR BLD AUTO: 83.3 % (ref 42.2–75.2)
NITRITE UR QL STRIP: POSITIVE
OPIATES UR QL SCN: NEGATIVE NG/ML
PCP UR QL SCN: NEGATIVE NG/ML
PH UR STRIP: 6.5 [PH] (ref 5–9)
PHOSPHATE SERPL-MCNC: 2.7 MG/DL (ref 2.5–4.9)
PLATELET # BLD AUTO: 266 K/UL (ref 140–450)
PLATELET # BLD AUTO: 274 K/UL (ref 140–450)
POTASSIUM SERPL-SCNC: 2.9 MMOL/L (ref 3.5–5.1)
POTASSIUM SERPL-SCNC: 3.8 MMOL/L (ref 3.5–5.1)
PROTHROMBIN TIME: 11.1 SECS (ref 10.8–13.4)
PROTHROMBIN TIME: 11.3 SECS (ref 10.8–13.4)
RBC # BLD AUTO: 4.74 MIL/UL (ref 4.2–6.1)
RBC # BLD AUTO: 4.92 MIL/UL (ref 4.2–6.1)
RBC #/AREA URNS HPF: (no result) /HPF (ref 0–5)
SODIUM SERPL-SCNC: 138 MMOL/L (ref 136–145)
SODIUM SERPL-SCNC: 143 MMOL/L (ref 136–145)
T4 FREE SERPL-MCNC: 1.14 NG/DL (ref 0.76–1.46)
TRIGL SERPL-MCNC: 52 MG/DL (ref 30–150)
TSH SERPL DL<=0.05 MIU/L-ACNC: 1.31 UIU/ML (ref 0.34–3.74)
WBC # BLD AUTO: 11.1 K/UL (ref 4.8–10.8)
WBC # BLD AUTO: 11.4 K/UL (ref 4.8–10.8)
WBC,URINE: (no result) /HPF (ref 0–5)

## 2021-05-14 PROCEDURE — 96365 THER/PROPH/DIAG IV INF INIT: CPT

## 2021-05-14 PROCEDURE — 96375 TX/PRO/DX INJ NEW DRUG ADDON: CPT

## 2021-05-14 PROCEDURE — 96361 HYDRATE IV INFUSION ADD-ON: CPT

## 2021-05-14 PROCEDURE — G0378 HOSPITAL OBSERVATION PER HR: HCPCS

## 2021-05-14 PROCEDURE — 99285 EMERGENCY DEPT VISIT HI MDM: CPT

## 2021-05-14 PROCEDURE — G0482 DRUG TEST DEF 15-21 CLASSES: HCPCS

## 2021-05-14 PROCEDURE — 96372 THER/PROPH/DIAG INJ SC/IM: CPT

## 2021-05-14 RX ADMIN — Medication SCH EACH: at 09:00

## 2021-05-14 RX ADMIN — FUROSEMIDE SCH MG: 10 INJECTION, SOLUTION INTRAMUSCULAR; INTRAVENOUS at 21:13

## 2021-05-14 RX ADMIN — HEPARIN SODIUM SCH MLS/HR: 5000 INJECTION, SOLUTION INTRAVENOUS at 10:56

## 2021-05-14 RX ADMIN — ZOLPIDEM TARTRATE PRN MG: 5 TABLET ORAL at 23:15

## 2021-05-14 RX ADMIN — ATORVASTATIN CALCIUM SCH MG: 20 TABLET, FILM COATED ORAL at 17:12

## 2021-05-14 RX ADMIN — HEPARIN SODIUM SCH MLS/HR: 5000 INJECTION, SOLUTION INTRAVENOUS at 17:07

## 2021-05-14 RX ADMIN — FUROSEMIDE SCH MG: 10 INJECTION, SOLUTION INTRAMUSCULAR; INTRAVENOUS at 09:00

## 2021-05-14 NOTE — NUR
RECEIVED BEDSIDE ENDORSEMENT FROM AM SHIFT RN. PATIENT IS AAOX4, HEPARIN DRIP AT 14ML/HR 
INFUSING. ON ROOM AIR, AMBULATORY, SAFETY MEASURES IN PLACE, PLAN OF CARE DISCUSSED, CALL 
LIGHT WITHIN REACH.

## 2021-05-14 NOTE — NUR
DC PLANNIN YRS OLD MALE PATIENT WAS ADMITTED FROM HOME WITH A DX OF PULMONARY EMBOLISM. PATIENT HAS 
A HX OF COPD, HTN, A-FIB AND CHF. CXR SHOWED LARGE CARDIAC SILHOUETTE, CT CHEST SHOWED 
POORLY IDENTIFIED FILLING DEFECTS WITHIN THE LEFT MAIN PULMONARY ARTERY PE VS FLOW 
ARTIFACTS. CT ABD MODERATE SIZE HIATAL HERNIA. RAPID COVID TEST NEGATIVE. BLOOD AND URINE 
CULTURE PENDING.  STARTED ON HEPARIN DRIP , IV ABX ROCEPHIN AND LASIX IV. CONSULTED WITH 
CARDIO, PULMO AND NEPHRO. DC PLANNING TO GO HOME WHEN STABLE CM TO FOLLOW. 

-------------------------------------------------------------------------------

Addendum: 21 at 1030 by Sue Jin RN

-------------------------------------------------------------------------------

DC PLANING:

SEEN BY PULMO AND CARDIOLOGIST DR EMMANUEL CONTINUE MAINTENANCE DOSE IV LASIX , XARELTO, AND 
ENCOURAGED TO STOP METH USE. PT IS IRRITABLE AND CONFUSED AT TIMES. PT RECOMMENDED SNF 
PLACEMENT PATIENT REFUSED TO GO TO SNF WILL CONTACT PT'S WIFE HUNTER. CM TO FOLLOW 

-------------------------------------------------------------------------------

Addendum: 21 at 1053 by Mary Montes CM

-------------------------------------------------------------------------------

FAREED PLANNER:

FAXED ORDER FOR HOME HEALTH TO [facility]. SPOKE TO TOMI HERNANDEZ [phone] SHE WILL CALL ME 
BACK ONCE HOME HEALTH IS ARRANGED.

-------------------------------------------------------------------------------

Addendum: 21 at 1301 by Mary Montes CM

-------------------------------------------------------------------------------

FAREED PLANNER:

RECEIVED A PHONE CALL BACK FROM TOMI HERNANDEZ, HOME HEALTH HAS BEEN ARRANGED WITH [facility] [phone]. FOLLOW UP APPOINTMENT TUESDAY MAY 25,2021 AT 3:15 PM. Matt LY 
[facility]

## 2021-05-14 NOTE — NUR
PT UNABLE TO SLEEP. OFFERED SLEEPING MED, PT SAID YES. AMBIEN PO GIVEN AS ORDERED, TOLERATED 
WELL. KEPT WARM AND COMFORTABLE, WILL CONTINUE TO MONITOR, CALL LIGHT WITHIN REACH.

## 2021-05-14 NOTE — NUR
REC'D PT FROM ED PT A/Ox4, RA. L.FA 18G INFUSING POTASSIUM CHLORIDE AT 100ML/HR, PATENT, LEAH 
LUNGS CLEAR, DIMINISHED. ABD SOFT, NON TENDER NO PERIPHERAL EDEMA NOTED. TELEMONITOR IN 
PLACE. PT IS UNKEMPT. COOPERATIVE. CALL LIGHT WITHIN REACH. ALL SAFETY MEASURES IN PLACE. 
BED LOWEST POSITION. VS TAKEN. WILL CONTINUE TO MONITOR

## 2021-05-14 NOTE — NUR
HEPARIN DRIP STARTED, REC'D CALL FROM PHARMACY, LOVENOX GIVEN IN ED. THEREFORE HEPARIN DRIP 
SHOULD BE STARTED 12 HOURS, NOTIFIED DR. GUTIERREZ SHE IS RECOMMENDED BY PHARMACY TO BEGIN 
HEPARIN AT 1700, PER DR. GUTIERREZ OK TO HOLD HEPARIN AND RESUME DRIP AT 1700. HEPARIN DRIP 
DISCONTINUED. WILL RESUME AT 1700. MOA AND SIDE EFFFECTS DISCUSSED WITH PT WHO VERBALIZED 
UNDERSTANDING.

## 2021-05-14 NOTE — NUR
PATIENT HAS BEEN SCREENED AND CATEGORIZED AS MODERATE NUTRITION RISK. PATIENT WILL BE SEEN 
WITHIN 3-5 DAYS OF ADMISSION.



05/16/21 05/18/21



AMALIA YU RD

## 2021-05-14 NOTE — NUR
ENDORSED PT TO NIGHT SHIFT NURSE, PT A/OX4, RA. ON HEPARIN DRIP AT 14ML/HR, PTT ORDERED FOR 
2300. CALL LIGHT WITHIN REACH. ALL SAFETY MEASURES IN PLACE.

## 2021-05-14 NOTE — NUR
PT ACCIDENTALLY PULLED OUT IV WHILE WALKING TO RESTROOM, CLEANED BLOOD OFF OF R. HAND. NEW 
IV SITE WILL BE PLACED.

## 2021-05-14 NOTE — NUR
RESUMED HEPARIN DRIP, DISCUSSED MOA AND SIDE EFFECTS DISCUSSED WITH PT WHO VERBALIZED 
UNDERSTANDING. WILL CONTINUE TO MONITOR. PTT WAS ORDERED FOR 2300 ( 6 HOURS AFTER STARTING 
DRIP). ALL SAFETY MEASURES IN PLACE.

## 2021-05-14 NOTE — NUR
RECEIVED IN BED 9 FROM TRIAGE WITH C/O ABDOMINAL PAIN AND SOB. NO SOB NOTED AT 
THIS TIME, O2 SAT = 100 % ON R/A. SPEACH IS CLEAR. PT IS VAGUE REGARDING LIVING 
SITUATION. IS UNKEMPT AND DISHEVELED WITH LACK OF HYGIENE AND IS PUNGENT. 
ATTACHED TO CM = A-FIB WITH OCCASIONAL PVC



PMH : A-FIB

NKDA

## 2021-05-14 NOTE — NUR
HAS BEEN INCONTINENT OF LARGE [place] STOOL WHICH HAS DRIED. CLEANSED, 
LINENS CHANGED.  CONSENT SIGNED FOR CT WITH CONTRAST

## 2021-05-14 NOTE — NUR
Patient will be admitted to care of DR GUTIERREZ. Admited to TELE.  Will go to 
room 121A. Belongings list completed.  Report to ROMEO TREJO.

## 2021-05-14 NOTE — NUR
SPOKE TO CARISSA PITTS, WIFE, UPDATED ON STATUS OF PT'S CONDITION. PLACED PHONE IN PT'S ROOM 
AND DIALED NUMBER FOR PT TO CONTACT WIFE DIRECTLY. PT ALEXIS

## 2021-05-14 NOTE — NUR
NEW IV CATHETER PLACED BY NEGRITO WALTERS. L.HAND 22G AND R. WRIST 22G. ONE ATTEMPT TO INSERT EACH 
LINE. PT TOLERATED PROCEDURE WELL.

## 2021-05-15 VITALS — DIASTOLIC BLOOD PRESSURE: 71 MMHG | SYSTOLIC BLOOD PRESSURE: 99 MMHG

## 2021-05-15 VITALS — DIASTOLIC BLOOD PRESSURE: 64 MMHG | SYSTOLIC BLOOD PRESSURE: 99 MMHG

## 2021-05-15 VITALS — SYSTOLIC BLOOD PRESSURE: 96 MMHG | DIASTOLIC BLOOD PRESSURE: 73 MMHG

## 2021-05-15 VITALS — DIASTOLIC BLOOD PRESSURE: 49 MMHG | SYSTOLIC BLOOD PRESSURE: 124 MMHG

## 2021-05-15 VITALS — SYSTOLIC BLOOD PRESSURE: 122 MMHG | DIASTOLIC BLOOD PRESSURE: 81 MMHG

## 2021-05-15 VITALS — DIASTOLIC BLOOD PRESSURE: 81 MMHG | SYSTOLIC BLOOD PRESSURE: 135 MMHG

## 2021-05-15 LAB
ANION GAP SERPL CALCULATED.3IONS-SCNC: 15.3 MMOL/L (ref 8–16)
BASOPHILS # BLD AUTO: 0 K/UL (ref 0–0.22)
BASOPHILS NFR BLD AUTO: 0.3 % (ref 0–2)
BUN SERPL-MCNC: 14 MG/DL (ref 7–18)
CHLORIDE SERPL-SCNC: 100 MMOL/L (ref 98–107)
CO2 SERPL-SCNC: 26 MMOL/L (ref 21–32)
CREAT SERPL-MCNC: 1.3 MG/DL (ref 0.6–1.3)
EOSINOPHIL # BLD AUTO: 0 K/UL (ref 0–0.4)
EOSINOPHIL NFR BLD AUTO: 0 % (ref 0–4)
ERYTHROCYTE [DISTWIDTH] IN BLOOD BY AUTOMATED COUNT: 13.4 % (ref 11.6–13.7)
GFR SERPL CREATININE-BSD FRML MDRD: (no result) ML/MIN (ref 90–?)
GLUCOSE SERPL-MCNC: 117 MG/DL (ref 74–106)
HCT VFR BLD AUTO: 51.8 % (ref 36–52)
HGB BLD-MCNC: 17.8 G/DL (ref 12–18)
LYMPHOCYTES # BLD AUTO: 1.8 K/UL (ref 2–11.5)
LYMPHOCYTES NFR BLD AUTO: 12.3 % (ref 20.5–51.1)
MAGNESIUM SERPL-MCNC: 1.7 MG/DL (ref 1.8–2.4)
MCH RBC QN AUTO: 32 PG (ref 27–31)
MCHC RBC AUTO-ENTMCNC: 34 G/DL (ref 33–37)
MCV RBC AUTO: 94.1 FL (ref 80–94)
MONOCYTES # BLD AUTO: 1 K/UL (ref 0.8–1)
MONOCYTES NFR BLD AUTO: 6.4 % (ref 1.7–9.3)
NEUTROPHILS # BLD AUTO: 12.1 K/UL (ref 1.8–7.7)
NEUTROPHILS NFR BLD AUTO: 81 % (ref 42.2–75.2)
PHOSPHATE SERPL-MCNC: 2.8 MG/DL (ref 2.5–4.9)
PLATELET # BLD AUTO: 286 K/UL (ref 140–450)
POTASSIUM SERPL-SCNC: 3.3 MMOL/L (ref 3.5–5.1)
RBC # BLD AUTO: 5.51 MIL/UL (ref 4.2–6.1)
SODIUM SERPL-SCNC: 138 MMOL/L (ref 136–145)
WBC # BLD AUTO: 14.9 K/UL (ref 4.8–10.8)

## 2021-05-15 RX ADMIN — ATORVASTATIN CALCIUM SCH MG: 20 TABLET, FILM COATED ORAL at 17:59

## 2021-05-15 RX ADMIN — ZOLPIDEM TARTRATE PRN MG: 5 TABLET ORAL at 20:40

## 2021-05-15 RX ADMIN — Medication SCH MG: at 09:29

## 2021-05-15 RX ADMIN — METRONIDAZOLE SCH MLS/HR: 500 SOLUTION INTRAVENOUS at 20:21

## 2021-05-15 RX ADMIN — FUROSEMIDE SCH MG: 10 INJECTION, SOLUTION INTRAMUSCULAR; INTRAVENOUS at 21:41

## 2021-05-15 RX ADMIN — FUROSEMIDE SCH MG: 10 INJECTION, SOLUTION INTRAMUSCULAR; INTRAVENOUS at 09:31

## 2021-05-15 RX ADMIN — Medication SCH EACH: at 09:29

## 2021-05-15 NOTE — NUR
PT WALKED AND URINATED ON THE FLOOR SEVERAL TIMES, ASSISTED PT BACK TO BED SAFELY, KEPT 
CLEAN, DRY AND COMFORTABLE, CALL LIGHT WITHIN REACH. Plan  Gabapentin 400 MG Oral Capsule; TAKE ONE CAPSULE BY MOUTH TWICE  DAILY    Message   Recorded as Task   Date: 09/22/2017 03:10 PM, Created By: Lou Dunlap   Task Name: ,Provider Clinical Communication   Assigned To: Lou Dunlap   Regarding Patient: ROSETTA MOLINA, Status: Active   Comment:    Lou Dunlap - 22 Sep 2017 3:10 PM     TASK CREATED  Pt phoned to verify her Gabapentin dose. She is taking 300 mg capsules, 2 tablets twice daily.   She wanted to let you know the correct dose so that you can taper her off correctly.   JADA TINSLEY - 22 Sep 2017 3:29 PM     TASK REPLIED TO: Previously Assigned To JADA TINSLEY      Plans change gabapentin to 300 mg, 1 capsule twice a day.     Thank you   Lou Dunlap - 22 Sep 2017 3:52 PM     TASK EDITED  Phoned pt. to let her know of the tapering dose: Spoke to pt. she verbalized understanding of the decreased dose, will call in a week or so with an update on her condition.     Signatures   Electronically signed by : Lou Dunlap R.N.; Sep 22 2017  3:59PM CST

## 2021-05-15 NOTE — NUR
PT IS ANXIOUS, ATIVAN IVP PRN GIVEN AS ORDERED, TOLERATED WELL, NO A/R NOTED, WILL CONTINUE 
TO MONITOR.

## 2021-05-15 NOTE — NUR
TOLD PT THAT IF HE CANNOT SLEEP, JUST LET ME KNOW, I HAVE MED TO HELP HIM SLEEP. PT STATES, 
I WANT TO TAKE THE SLEEPING MED NOW. AMBIEN PO GIVEN PRN AS ORDERED FOR INSOMNIA

## 2021-05-15 NOTE — NUR
ADMINISTERED PRESCRIBED MEDS PER MD ORDER. PATIENT TOLERATED WELL. MEDICATION EDUCTION 
PROVIDED, REINFORCEMENT NEEDED, PATIENT IS CONFUSED. SAFETY MEASURES IN PLACE. WILL CONTINUE 
TO MONITOR.

## 2021-05-15 NOTE — NUR
PATIENT ROUNDING PERFORMED. PATIENT IS SLEEPING IN BED. PATIENT CLEANED AND CHANGED BY CNA 
AT 1700, RESTRAINTS REMOVED, SKIN CHECK PERFORMED. SAFETY MEASURES IN PLACE. WILL CONTINUE 
TO MONITOR.

## 2021-05-15 NOTE — NUR
PT STABLE, NO SOB, NO DISTRESS, ALL NEEDS ATTENDED, BEDSIDE ENDORSEMENT GIVEN TO AM SHIFT RN 
FOR CONTINUITY OF CARE.

## 2021-05-15 NOTE — NUR
PATIENT IS ANXIOUS, GETS UP IN BED FOR NO REASON, THEN HE GET BACK TO BED AGAIN, ASKED IF 
HE'S OK, HE SAID YES. ATIVAN GIVEN PRN FOR ANXIETY AS ORDERED.

## 2021-05-15 NOTE — NUR
PATIENT ASLEEP IN BED, VISIBLE RISE AND FALL OF CHEST NOTED. NO SIGNS OF DISCOMFORT OR 
DISTRESS. SKIN CHECK ON RESTRAINTS. CAP REFILL < 3 SEC. SAFETY MEASURES IN PLACE. WILL 
CONTINUE TO MONITOR.

## 2021-05-15 NOTE — NUR
ADMINISTERED PRESCRIBED MEDICATION FOR PRN AGITATION AS ORDERED BY MD. PATIENT TOLERATED 
WELL. MEDICATION REINFORCEMENT NEEDED. SAFETY MEASURES IN PLACE. WILL CONTINUE TO MONITOR.

## 2021-05-15 NOTE — NUR
PATIENT IS ASLEEP, NOTED CHEST RISE. ROCEPHIN IV GIVEN AS ORDERED, TOLERATED WELL, NO A/R 
NOTED, WILL CONTINUE TO MONITOR.

## 2021-05-15 NOTE — NUR
RECEIVED LATEST PTT 56.5, NO CHANGES PER PROTOCOL, WILL CONTINUE TO MONITOR, CALL LIGHT 
WITHIN REACH.

## 2021-05-15 NOTE — NUR
RECEIVED BEDSIDE ENDORSEMENT FROM AM SHIFT RN. PT IS ASLEEP, ON HIS RT SIDE, RESPIRATION 
EVEN AND UNLABORED, AROUSABLE TO VERBAL, DENIES PAIN, ABD SOFT, HEPARIN DRIP INFUSING AT 
1400 UNITS ON RW 22 G, INTACT AND PATENT, ON BILATERAL SOFT WRIST RESTRAIN FOR GETTING OUT 
OF BED UNASSISTED, PULLING OFF IV CATHETER. LOW BED IN PLACE, PLAN OF CARE DISCUSSED, SAFETY 
MEASURES IN PLACE, CALL LIGHT WITHIN REACH, WILL CONTINUE TO MONITOR.

## 2021-05-15 NOTE — NUR
ADMINISTERED PRESCRIBED MEDS FOR PRN AGITATION PER MD ORDER. PATIENT GETTING OUT OF BED, 
UNSTEADY GAIT, FALL RISK. PATIENT HAS PULLED IV LINES, ON HEPARIN DRIP. MD ORDERED SOFT 
RESTRAINTS. PATIENT NOW IN BED, SOFT WRIST RESTRAINTS IN PLACE AT 1520. SAFETY MEASURES IN 
PLACE. WILL CONTINUE TO MONITOR.

## 2021-05-16 VITALS — DIASTOLIC BLOOD PRESSURE: 62 MMHG | SYSTOLIC BLOOD PRESSURE: 104 MMHG

## 2021-05-16 VITALS — SYSTOLIC BLOOD PRESSURE: 105 MMHG | DIASTOLIC BLOOD PRESSURE: 50 MMHG

## 2021-05-16 VITALS — SYSTOLIC BLOOD PRESSURE: 109 MMHG | DIASTOLIC BLOOD PRESSURE: 74 MMHG

## 2021-05-16 VITALS — DIASTOLIC BLOOD PRESSURE: 80 MMHG | SYSTOLIC BLOOD PRESSURE: 116 MMHG

## 2021-05-16 VITALS — DIASTOLIC BLOOD PRESSURE: 66 MMHG | SYSTOLIC BLOOD PRESSURE: 122 MMHG

## 2021-05-16 VITALS — SYSTOLIC BLOOD PRESSURE: 99 MMHG | DIASTOLIC BLOOD PRESSURE: 64 MMHG

## 2021-05-16 LAB
ANION GAP SERPL CALCULATED.3IONS-SCNC: 11.7 MMOL/L (ref 8–16)
BASOPHILS # BLD AUTO: 0 K/UL (ref 0–0.22)
BASOPHILS NFR BLD AUTO: 0.3 % (ref 0–2)
BUN SERPL-MCNC: 19 MG/DL (ref 7–18)
CHLORIDE SERPL-SCNC: 103 MMOL/L (ref 98–107)
CO2 SERPL-SCNC: 28 MMOL/L (ref 21–32)
CREAT SERPL-MCNC: 1.4 MG/DL (ref 0.6–1.3)
EOSINOPHIL # BLD AUTO: 0 K/UL (ref 0–0.4)
EOSINOPHIL NFR BLD AUTO: 0.1 % (ref 0–4)
ERYTHROCYTE [DISTWIDTH] IN BLOOD BY AUTOMATED COUNT: 13 % (ref 11.6–13.7)
GFR SERPL CREATININE-BSD FRML MDRD: (no result) ML/MIN (ref 90–?)
GLUCOSE SERPL-MCNC: 122 MG/DL (ref 74–106)
HCT VFR BLD AUTO: 47.2 % (ref 36–52)
HGB BLD-MCNC: 15.9 G/DL (ref 12–18)
LYMPHOCYTES # BLD AUTO: 1.9 K/UL (ref 2–11.5)
LYMPHOCYTES NFR BLD AUTO: 15.1 % (ref 20.5–51.1)
MAGNESIUM SERPL-MCNC: 2.4 MG/DL (ref 1.8–2.4)
MCH RBC QN AUTO: 32 PG (ref 27–31)
MCHC RBC AUTO-ENTMCNC: 34 G/DL (ref 33–37)
MCV RBC AUTO: 95.1 FL (ref 80–94)
MONOCYTES # BLD AUTO: 0.9 K/UL (ref 0.8–1)
MONOCYTES NFR BLD AUTO: 7.1 % (ref 1.7–9.3)
NEUTROPHILS # BLD AUTO: 9.7 K/UL (ref 1.8–7.7)
NEUTROPHILS NFR BLD AUTO: 77.4 % (ref 42.2–75.2)
PHOSPHATE SERPL-MCNC: 3.4 MG/DL (ref 2.5–4.9)
PLATELET # BLD AUTO: 267 K/UL (ref 140–450)
POTASSIUM SERPL-SCNC: 3.7 MMOL/L (ref 3.5–5.1)
RBC # BLD AUTO: 4.96 MIL/UL (ref 4.2–6.1)
SODIUM SERPL-SCNC: 139 MMOL/L (ref 136–145)
WBC # BLD AUTO: 12.6 K/UL (ref 4.8–10.8)

## 2021-05-16 RX ADMIN — HEPARIN SODIUM SCH MLS/HR: 5000 INJECTION, SOLUTION INTRAVENOUS at 07:55

## 2021-05-16 RX ADMIN — FUROSEMIDE SCH MG: 10 INJECTION, SOLUTION INTRAMUSCULAR; INTRAVENOUS at 08:53

## 2021-05-16 RX ADMIN — METRONIDAZOLE SCH MLS/HR: 500 SOLUTION INTRAVENOUS at 13:38

## 2021-05-16 RX ADMIN — HEPARIN SODIUM SCH MLS/HR: 5000 INJECTION, SOLUTION INTRAVENOUS at 06:30

## 2021-05-16 RX ADMIN — ZOLPIDEM TARTRATE PRN MG: 5 TABLET ORAL at 23:24

## 2021-05-16 RX ADMIN — HEPARIN SODIUM SCH MLS/HR: 5000 INJECTION, SOLUTION INTRAVENOUS at 14:42

## 2021-05-16 RX ADMIN — METRONIDAZOLE SCH MLS/HR: 500 SOLUTION INTRAVENOUS at 20:46

## 2021-05-16 RX ADMIN — ATORVASTATIN CALCIUM SCH MG: 20 TABLET, FILM COATED ORAL at 17:00

## 2021-05-16 RX ADMIN — Medication SCH EACH: at 08:54

## 2021-05-16 RX ADMIN — METRONIDAZOLE SCH MLS/HR: 500 SOLUTION INTRAVENOUS at 04:32

## 2021-05-16 RX ADMIN — Medication SCH MG: at 08:54

## 2021-05-16 NOTE — NUR
MEDICATIONS ADMINISTERED PER MD ORDER. PT EDUCATED. VERBALIZED UNDERSTANDING. REINFORCEMENT 
NEEDED. PT RESTING IN BED EATING BREAKFAST. NO S/S OF DISTRESS. CALL LIGHT WITHIN REACH. PT 
EXPERIENCED AGITATION. PRN GIVEN PER MD ORDER.

## 2021-05-16 NOTE — NUR
PATIENT IS STABLE, NO SOB, NO DISTRESS, KEPT CLEAN, DRY AND COMFORTABLE, ALL NEEDS ATTENDED, 
BEDSIDE ENDORSEMENT PROVIDED TO AM SHIFT RN FOR CONTINUITY OF CARE. CONTINUE ON 1:1 SITTER.

## 2021-05-16 NOTE — NUR
PATIENT WAS ON BILATERAL SOFT RESTRAINT, RN CHECKED THE DISTAL CIRCULATION OF RESTRAINED 
LIMBS WITH GOOD CAP REFILL LESS THAN 3 SECONDS, ON HEPARIN DRIP RECEIVED PATIENT WAS 
RECEIVING CONTINUES 1100 UNITS/ HOUR.  NEXT PTT IS SCHEDULED BY 2040 PER ORDER.

## 2021-05-16 NOTE — NUR
PT RESTIN IN BED COMFORTABLY. MEDICATIONS GIVEN PER MD ORDER. PT EDUCATED AND VERBALIZED 
UNDERSTANDING NO S/S OF DISTRESS AT THIS TIME.

## 2021-05-16 NOTE — NUR
RN RECEIVED A CALL FROM THE LAB RE PTT RESULT= 56, LEVEL WAS THERAPEUTIC PER PROTOCOL, 
HEPARIN STILL INFUSING AT 1100 UNITS/HOUR, CHARGE NURSE AWARE.

## 2021-05-16 NOTE — NUR
PATIENT WAS RECEIVED IN BED ASLEEP WITH NORMAL BREATHING PATTERN, REGULAR AND EQUAL CHEST 
RISE. AND FALL.

## 2021-05-16 NOTE — NUR
PTT 85.2 RATE ADJUSTED PER MD ORDER. PT EDUCATED. PT NEEDS REINFORCEMENT. NO S/S OF DISTRESS 
AT THIS TME

## 2021-05-16 NOTE — NUR
PT ANXIOUS GETTING OUT OF BED AND GROWLING. PRN GIVEN PER MD ORDER. PT EDUCATED VERBALIZED 
UNDERSTANDING. REINFORCEMENT NEEDED. PT TOLERATED WELL. NO S/S OF DISTRESS.  SITTER AT 
BEDSIDE

## 2021-05-16 NOTE — NUR
PATIENT IS ASLEEP, RESPIRATION EVEN AND UNLABORED, KEPT CLEAN, DRY AND COMFORTABLE. CALL 
LIGHT WITHIN REACH.

## 2021-05-16 NOTE — NUR
PTT 88.1. HEPARIN ADMINISTERED PER PROTOCOL. PT EDUCATED REINFORCEMENT NEEDED. NO S/S OF 
DISTRESS AT THIS TIME.

## 2021-05-16 NOTE — NUR
MEDICATIONS GIVEN PER MD ORDER. PT EDUCATED VERBALIZED UNDERSTANDING. REINFORCEMENT NEEDED. 
ALL SAFETY MEASURES ARE IN PLACE SITTER AT BEDSIDE.

## 2021-05-16 NOTE — NUR
HEPARIN DRIP FINISHED, HANGED [place] [address]74 UNITS/ML. WILL CONTINUE TO 
MONITOR, CALL LIGHT WITHIN REACH.

## 2021-05-16 NOTE — NUR
PTS MEDICATIONS GIVEN PER MD ORDER. PT EDUCATED. NO S/S OF DISTRESS AT THIS TIME. PTS IV 
FLUID RATE CHANGED PER MD ORDER. PT TOLERATED WELL.

## 2021-05-16 NOTE — NUR
PT CHANGED AND REPOSITIONED PT TAKEN OFF RESTRAINS FOR 15 MIN PT TOLERATED WELL. WILL 
CONTINUE TO MONITOR. PT EATING BREAKFAST NO N/V

## 2021-05-16 NOTE — NUR
MAGNESIUM IV GIVEN PRN FOR MG 1.7 AS ORDERED, TOLERATED WELL, WILL CONTINUE TO MONITOR, CALL 
LIGHT WITHIN REACH.

## 2021-05-16 NOTE — NUR
PT RECEIVED FORM NIGHT SHIFT RN. PT RESTING IN BED COMFORTABLY. NO S/S OF DISTRESS AT THIS 
TIME sitter at bedside. all safety measures are in place. iv is dry clean and intact.

## 2021-05-17 VITALS — DIASTOLIC BLOOD PRESSURE: 79 MMHG | SYSTOLIC BLOOD PRESSURE: 112 MMHG

## 2021-05-17 VITALS — SYSTOLIC BLOOD PRESSURE: 104 MMHG | DIASTOLIC BLOOD PRESSURE: 75 MMHG

## 2021-05-17 VITALS — DIASTOLIC BLOOD PRESSURE: 75 MMHG | SYSTOLIC BLOOD PRESSURE: 107 MMHG

## 2021-05-17 VITALS — DIASTOLIC BLOOD PRESSURE: 71 MMHG | SYSTOLIC BLOOD PRESSURE: 118 MMHG

## 2021-05-17 VITALS — SYSTOLIC BLOOD PRESSURE: 114 MMHG | DIASTOLIC BLOOD PRESSURE: 85 MMHG

## 2021-05-17 VITALS — DIASTOLIC BLOOD PRESSURE: 80 MMHG | SYSTOLIC BLOOD PRESSURE: 105 MMHG

## 2021-05-17 LAB
ANION GAP SERPL CALCULATED.3IONS-SCNC: 11.7 MMOL/L (ref 8–16)
BASOPHILS # BLD AUTO: 0 K/UL (ref 0–0.22)
BASOPHILS NFR BLD AUTO: 0.1 % (ref 0–2)
BUN SERPL-MCNC: 29 MG/DL (ref 7–18)
CHLORIDE SERPL-SCNC: 104 MMOL/L (ref 98–107)
CO2 SERPL-SCNC: 26.3 MMOL/L (ref 21–32)
CREAT SERPL-MCNC: 1.3 MG/DL (ref 0.6–1.3)
EOSINOPHIL # BLD AUTO: 0.4 K/UL (ref 0–0.4)
EOSINOPHIL NFR BLD AUTO: 2.2 % (ref 0–4)
ERYTHROCYTE [DISTWIDTH] IN BLOOD BY AUTOMATED COUNT: 13 % (ref 11.6–13.7)
GFR SERPL CREATININE-BSD FRML MDRD: (no result) ML/MIN (ref 90–?)
GLUCOSE SERPL-MCNC: 133 MG/DL (ref 74–106)
HCT VFR BLD AUTO: 43.8 % (ref 36–52)
HGB BLD-MCNC: 14.8 G/DL (ref 12–18)
LYMPHOCYTES # BLD AUTO: 1.3 K/UL (ref 2–11.5)
LYMPHOCYTES NFR BLD AUTO: 8 % (ref 20.5–51.1)
MAGNESIUM SERPL-MCNC: 2 MG/DL (ref 1.8–2.4)
MCH RBC QN AUTO: 32 PG (ref 27–31)
MCHC RBC AUTO-ENTMCNC: 34 G/DL (ref 33–37)
MCV RBC AUTO: 93.7 FL (ref 80–94)
MONOCYTES # BLD AUTO: 0.7 K/UL (ref 0.8–1)
MONOCYTES NFR BLD AUTO: 3.9 % (ref 1.7–9.3)
NEUTROPHILS # BLD AUTO: 14.4 K/UL (ref 1.8–7.7)
NEUTROPHILS NFR BLD AUTO: 85.8 % (ref 42.2–75.2)
PHOSPHATE SERPL-MCNC: 3.4 MG/DL (ref 2.5–4.9)
PLATELET # BLD AUTO: 273 K/UL (ref 140–450)
POTASSIUM SERPL-SCNC: 4 MMOL/L (ref 3.5–5.1)
RBC # BLD AUTO: 4.68 MIL/UL (ref 4.2–6.1)
SODIUM SERPL-SCNC: 138 MMOL/L (ref 136–145)
WBC # BLD AUTO: 16.8 K/UL (ref 4.8–10.8)

## 2021-05-17 RX ADMIN — ATORVASTATIN CALCIUM SCH MG: 20 TABLET, FILM COATED ORAL at 16:36

## 2021-05-17 RX ADMIN — Medication SCH MG: at 09:00

## 2021-05-17 RX ADMIN — RIVAROXABAN SCH MG: 15 TABLET, FILM COATED ORAL at 08:59

## 2021-05-17 RX ADMIN — RIVAROXABAN SCH MG: 15 TABLET, FILM COATED ORAL at 20:39

## 2021-05-17 RX ADMIN — METRONIDAZOLE SCH MLS/HR: 500 SOLUTION INTRAVENOUS at 21:42

## 2021-05-17 RX ADMIN — METRONIDAZOLE SCH MLS/HR: 500 SOLUTION INTRAVENOUS at 13:00

## 2021-05-17 RX ADMIN — FUROSEMIDE SCH MG: 10 INJECTION, SOLUTION INTRAMUSCULAR; INTRAVENOUS at 09:01

## 2021-05-17 RX ADMIN — Medication SCH EACH: at 08:59

## 2021-05-17 RX ADMIN — METRONIDAZOLE SCH MLS/HR: 500 SOLUTION INTRAVENOUS at 06:36

## 2021-05-17 RX ADMIN — HEPARIN SODIUM SCH MLS/HR: 5000 INJECTION, SOLUTION INTRAVENOUS at 06:43

## 2021-05-17 NOTE — NUR
PATIENT IN BED AWAKE AND ALERT. STARTED YELLING UNCONTROLLABLE. ABLE TO REDIRECT PATIENT AND 
CALMED PATIENT. ATIVAN GIVEN FOR ANXIETY. MORNING ROUTINE MEDICATIONS GIVEN. PATIENT 
TOLERATED WELL. ZESTRIL HELD D/T LOW PARAMETER. RIGHT IV ACCESS DISLODGED. LH 24 G INTACT 
AND PATENT. SKIN WARM TO TOUCH AND INTACT. NO EDEMA NOTED. PATIENT ABLE TO FOLLOW DIRECTION. 
BILATERAL UPPER RESTRAINTS NOTED, NO SKIN INJURIES NOTED. SITTER BY BEDSIDE. NO NOTED 
DISTRESS. WILL CONTINUE TO MONITOR.

## 2021-05-17 NOTE — NUR
LATE ENTRY -- ROCEPHIN INFUSION COMPLETED AT 0215, NS BOLUS AT 0235, AND 
POTASSIUM INFUSION COMPLETED AT 0315 ALL ON 5/14/21

## 2021-05-17 NOTE — NUR
RECEIVED BEDSIDE REPORT FROM JOHNATHAN JACOBSEN. PT AOX3 ON ROOM AIR. NO S/S RESPIRATORY DISTRESS. 
NO C/O PAIN AT THIS TIME/ IV SITE L HAND 24G PATENT INTACT, S.L. SAFETY MEASURES IN PLACE. 
SITTER AT BEDSIDE. WILL CONTINUE TO MONITOR

## 2021-05-17 NOTE — NUR
RECEIVED PATIENT FROM NIGHT NURSE. PATIENT IN BED SLEEPING, CHEST NOTED RISING. NO NOTED 
DISTRESS AT THIS TIME. BILATERAL UPPER SOFT WRIST RESTRAINTS NOTED, ALSO SITTER BY BEDSIDE. 
RW 22G IN FUSING HEPARIN DRIP, PER PROTOCOL. LH 24G SL. HOB ELEVATED, SAFETY MEASURES IN 
PLACE. CALL LIGHT WITHIN REACH. WILL CONTINUE TO MONITOR.

## 2021-05-17 NOTE — NUR
PATIENT CONTINUES TO BE EASILY IRRITABLE WITH VERBAL OUTBURSTS. PATIENT ATTEMPTED TO GET OUT 
OF BED SEVERAL TIMES BUT WAS REDIRECTED BACK TO BED. PATIENT COMPLIED WITH STAFF. RECEIVED 
ORDER FROM DR PINEDA TO RENEW RESTRAINTS. ORDER CARRIED OUT.

## 2021-05-17 NOTE — NUR
PATIENT ASSISTED WITH DINNER. ATE WELL. NO NOTED DISTRESS. CALL LIGHT WITHIN REACH. WILL 
CONTINUE TO MONITOR.

## 2021-05-17 NOTE — NUR
PRN ATIVAN GIVEN FOR C/O ANXIOUSNESS. EDUCATION PROVIDED. NO DISTRESS NOTED. SITTER AT 
BEDSIDE. WILL CONTINUE TO MONITOR

## 2021-05-17 NOTE — NUR
PATIENT IN BED SLEEPING, CHEST NOTED RISING. NO NOTED DISTRESS. CONTINUE ON RESTRAINTS AND 
SITTER. WILL CONTINUE TO MONITOR.

## 2021-05-17 NOTE — NUR
PATIENT IN BED AWAKE AND ALERT. RESPOND APPROPRIATELY. PATIENT HAS OCCASIONAL VERBAL 
OUTBURSTS BUT ABLE TO BE REDIRECTED. PATIENT MADE VERBAL AGREEMENT TO COMPLY WITH STAFF AND 
NOT REMOVE HIS IV ACCESS. BILATERAL UPPER SOFT RESTRAINTS REMOVED. DR PINEDA MADE AWARE. D/C 
ORDER FOR RESTRAINTS. SITTER REMAINS AT BEDSIDE FOR CLOSE OBSERVATION FOR SAFETY.

## 2021-05-17 NOTE — NUR
PATIENT YELLING INCOHERENTLY. STAFF ABLE TO REDIRECT PATIENT, AND PATIENT COMPLIED. WILL 
CONTINUE TO MONITOR. SITTER AT BEDSIDE

## 2021-05-17 NOTE — NUR
PATIENT WOKE UP SCREAMING. SITTER AT BEDSIDE RESPONDED AND ABLE TO REDIRECT PATIENT. WILL 
CONTINUE TO MONITOR.

## 2021-05-17 NOTE — NUR
ADMINISTERED SCHEDULED MEDICATIONS. EDUCATION PROVIDED. NO DISTRESS NOTED. SAFETY MEASURES 
IN PLACE. CALL LIGHT WITHIN REACH. SITTER AT BEDSIDE. WILL CONTINUE TO MONITOR

-------------------------------------------------------------------------------

Addendum: 05/18/21 at 0320 by Vince Page RN

-------------------------------------------------------------------------------

CALL LIGHT NOT WITHIN REACH

## 2021-05-17 NOTE — NUR
PATIENT ASLEEP IN BED. RESPIRATIONS EVEN UNLABORED. NO DISTRESS NOTED. SITTER AT BEDSIDE. 
WILL CONTINUE TO MONITOR

## 2021-05-18 VITALS — SYSTOLIC BLOOD PRESSURE: 119 MMHG | DIASTOLIC BLOOD PRESSURE: 76 MMHG

## 2021-05-18 VITALS — SYSTOLIC BLOOD PRESSURE: 89 MMHG | DIASTOLIC BLOOD PRESSURE: 62 MMHG

## 2021-05-18 VITALS — SYSTOLIC BLOOD PRESSURE: 124 MMHG | DIASTOLIC BLOOD PRESSURE: 100 MMHG

## 2021-05-18 VITALS — DIASTOLIC BLOOD PRESSURE: 57 MMHG | SYSTOLIC BLOOD PRESSURE: 116 MMHG

## 2021-05-18 VITALS — SYSTOLIC BLOOD PRESSURE: 107 MMHG | DIASTOLIC BLOOD PRESSURE: 80 MMHG

## 2021-05-18 VITALS — DIASTOLIC BLOOD PRESSURE: 72 MMHG | SYSTOLIC BLOOD PRESSURE: 109 MMHG

## 2021-05-18 LAB
ANION GAP SERPL CALCULATED.3IONS-SCNC: 11.4 MMOL/L (ref 8–16)
BASOPHILS # BLD AUTO: 0 K/UL (ref 0–0.22)
BASOPHILS NFR BLD AUTO: 0.2 % (ref 0–2)
BUN SERPL-MCNC: 29 MG/DL (ref 7–18)
CHLORIDE SERPL-SCNC: 107 MMOL/L (ref 98–107)
CO2 SERPL-SCNC: 25.4 MMOL/L (ref 21–32)
CREAT SERPL-MCNC: 1.1 MG/DL (ref 0.6–1.3)
EOSINOPHIL # BLD AUTO: 0 K/UL (ref 0–0.4)
EOSINOPHIL NFR BLD AUTO: 0.4 % (ref 0–4)
ERYTHROCYTE [DISTWIDTH] IN BLOOD BY AUTOMATED COUNT: 12.9 % (ref 11.6–13.7)
GFR SERPL CREATININE-BSD FRML MDRD: (no result) ML/MIN (ref 90–?)
GLUCOSE SERPL-MCNC: 104 MG/DL (ref 74–106)
HCT VFR BLD AUTO: 43.1 % (ref 36–52)
HGB BLD-MCNC: 14.6 G/DL (ref 12–18)
LYMPHOCYTES # BLD AUTO: 2.3 K/UL (ref 2–11.5)
LYMPHOCYTES NFR BLD AUTO: 18.2 % (ref 20.5–51.1)
MAGNESIUM SERPL-MCNC: 1.9 MG/DL (ref 1.8–2.4)
MCH RBC QN AUTO: 32 PG (ref 27–31)
MCHC RBC AUTO-ENTMCNC: 34 G/DL (ref 33–37)
MCV RBC AUTO: 93.2 FL (ref 80–94)
MONOCYTES # BLD AUTO: 0.6 K/UL (ref 0.8–1)
MONOCYTES NFR BLD AUTO: 5.1 % (ref 1.7–9.3)
NEUTROPHILS # BLD AUTO: 9.5 K/UL (ref 1.8–7.7)
NEUTROPHILS NFR BLD AUTO: 76.1 % (ref 42.2–75.2)
PHOSPHATE SERPL-MCNC: 3.7 MG/DL (ref 2.5–4.9)
PLATELET # BLD AUTO: 286 K/UL (ref 140–450)
POTASSIUM SERPL-SCNC: 3.8 MMOL/L (ref 3.5–5.1)
RBC # BLD AUTO: 4.63 MIL/UL (ref 4.2–6.1)
SODIUM SERPL-SCNC: 140 MMOL/L (ref 136–145)
WBC # BLD AUTO: 12.5 K/UL (ref 4.8–10.8)

## 2021-05-18 RX ADMIN — ATORVASTATIN CALCIUM SCH MG: 20 TABLET, FILM COATED ORAL at 18:12

## 2021-05-18 RX ADMIN — FUROSEMIDE SCH MG: 10 INJECTION, SOLUTION INTRAMUSCULAR; INTRAVENOUS at 09:24

## 2021-05-18 RX ADMIN — RIVAROXABAN SCH MG: 15 TABLET, FILM COATED ORAL at 09:34

## 2021-05-18 RX ADMIN — Medication SCH EACH: at 09:24

## 2021-05-18 RX ADMIN — METRONIDAZOLE SCH MLS/HR: 500 SOLUTION INTRAVENOUS at 05:03

## 2021-05-18 RX ADMIN — METRONIDAZOLE SCH MLS/HR: 500 SOLUTION INTRAVENOUS at 12:53

## 2021-05-18 RX ADMIN — Medication SCH MG: at 09:25

## 2021-05-18 RX ADMIN — RIVAROXABAN SCH MG: 15 TABLET, FILM COATED ORAL at 21:36

## 2021-05-18 RX ADMIN — METRONIDAZOLE SCH MLS/HR: 500 SOLUTION INTRAVENOUS at 22:23

## 2021-05-18 NOTE — NUR
PATIENT ACCIDENTALLY PULLED OUT IV. CANNULA INTACT. REINSERTED NEW IV TO R HAND 24G, GOOD 
BLOOD RETURN, PATENT INTACT. NO DISTRESS NOTED. SITTER AT BEDSIDE. WILL CONTINUE TO MONITOR

## 2021-05-18 NOTE — NUR
PT IS SITTING BY THE WINDOW, WIFE IS OUTSIDE WITH THE FAMILY DOG. PT CALM, TALKING TO HIS 
WIFE. WILL CONTINUE TO MONITOR PT'S BEHAVIOR.

## 2021-05-18 NOTE — NUR
SCHEDULED MEDICATION GIVEN. EDUCATION PROVIDED. NO DISTRESS NOTED. SITTER AT BEDSIDE. WILL 
CONTINUE TO MONITOR

## 2021-05-18 NOTE — NUR
RECEIVED BEDSIDE REPORT FROM JOHNATHAN WOOD. PT AOX3 ON ROOM AIR. NO S/S RESPIRATORY DISTRESS. 
NO C/O PAIN AT THIS TIME. NO IV SITE. SAFETY MEASURES IN PLACE. CALL LIGHT WITHIN REACH. 
WILL CONTINUE TO MONITOR

## 2021-05-18 NOTE — NUR
5/18/21 RD INITIAL ASSESSMENT COMPLETED



PLEASE REFER TO NUTRITION ASSESSMENT UNDER CARE ACTIVITY FOR ESTIMATED NUTRITIONAL NEEDS. 



1. CONTINUE CARDIAC DIET AS TOLERATED 

2. RD TO FOLLOW-UP 3-5 DAYS, MODERATE RISK 



AMALIA YU RD

## 2021-05-18 NOTE — NUR
PATIENT ROUNDING PERFORMED, PATIENT SLEEPING IN BED. NO SIGNS OF DISTRESS NOTED. 1:1 SITTER 
HAS BEEN DISCONTINUED. SAFETY MEASURES IN PLACE. WILL CONTINUE TO MONITOR.

## 2021-05-18 NOTE — NUR
SPOKE WITH DR. PINEDA ON THE PHONE, UPDATED WITH PT. AS PER DR. PINEDA, PT IS FOR D/C HOME 
TOMORROW.  NOTIFIED PT'S WIFE FRANKLYN, STATED HER PHONE IS DEAD AND UNABLE TO CHARGE IT. 
SHE WILL COME IN TOMORROW AT 11AM TO  PT. RN ASSIGNED MADE AWARE.

## 2021-05-18 NOTE — NUR
SCHEDULED MEDS GIVEN. PRN ATIVAN GIVEN FOR C/O ANXIETY. EDUCATION PROVIDED. NO DISTRESS 
NOTED. CALL LIGHT WITHIN REACH. BED IN LOW POSITION. WILL CONTINUE TO MONITOR

## 2021-05-18 NOTE — NUR
ADMINISTERED PRESCRIBED MEDS PER MD ORDER. PATIENT TOLERATED WELL. MEDICATION EDUCATION 
PROVIDED. PATIENT VERBALIZED UNDERSTANDING. SAFETY MEASURES IN PLACE. WILL CONTINUE TO 
MONITOR.

## 2021-05-18 NOTE — NUR
ADMINISTERED PRESCRIBED MEDS PER MD ORDER. PATIENT TOLERATED WELL. MEDICATION EDUCATION 
REINFORCEMENT NEEDED. PATIENT SITTING UPRIGHT IN BED. APPEARS TO BE IN OVERALL GOOD MOOD. 
DINNER TRAY DELIVERED AT BEDSIDE. SAFETY MEASURES IN PLACE. WILL CONTINUE TO MONITOR.

## 2021-05-18 NOTE — NUR
ADMINISTERED PRESCRIBED MEDS PER MD ORDER. PATIENT TOLERATED WELL. MEDICATION EDUCATION 
REINFORCEMENT NEEDED. PATIENT CURRENTLY SLEEPING. VISIBLE RISE AND FALL OF CHEST NOTED. NO 
SIGNS OF DISTRESS. SAFETY MEASURES IN PLACE. WILL CONTINUE TO MONITOR.

## 2021-05-18 NOTE — NUR
PATIENT AGITATED, SCREAMING AND TRYING TO GET OUT OF BED. ADMINISTERED MEDICATION FOR PRN 
AGITATION AS ORDERED BY MD. PATIENT TOLERATED WELL. MEDICATION EDUCATION REINFORCEMENT 
NEEDED, SAFETY MEASURES IN PLACE. WILL CONTINUE TO MONITOR.

## 2021-05-19 VITALS — SYSTOLIC BLOOD PRESSURE: 121 MMHG | DIASTOLIC BLOOD PRESSURE: 88 MMHG

## 2021-05-19 VITALS — SYSTOLIC BLOOD PRESSURE: 118 MMHG | DIASTOLIC BLOOD PRESSURE: 77 MMHG

## 2021-05-19 VITALS — SYSTOLIC BLOOD PRESSURE: 101 MMHG | DIASTOLIC BLOOD PRESSURE: 71 MMHG

## 2021-05-19 VITALS — DIASTOLIC BLOOD PRESSURE: 77 MMHG | SYSTOLIC BLOOD PRESSURE: 118 MMHG

## 2021-05-19 LAB
ANION GAP SERPL CALCULATED.3IONS-SCNC: 13.4 MMOL/L (ref 8–16)
BASOPHILS # BLD AUTO: 0 K/UL (ref 0–0.22)
BASOPHILS NFR BLD AUTO: 0.2 % (ref 0–2)
BUN SERPL-MCNC: 30 MG/DL (ref 7–18)
CHLORIDE SERPL-SCNC: 107 MMOL/L (ref 98–107)
CO2 SERPL-SCNC: 25.6 MMOL/L (ref 21–32)
CREAT SERPL-MCNC: 1.1 MG/DL (ref 0.6–1.3)
EOSINOPHIL # BLD AUTO: 0 K/UL (ref 0–0.4)
EOSINOPHIL NFR BLD AUTO: 0.1 % (ref 0–4)
ERYTHROCYTE [DISTWIDTH] IN BLOOD BY AUTOMATED COUNT: 13 % (ref 11.6–13.7)
GFR SERPL CREATININE-BSD FRML MDRD: (no result) ML/MIN (ref 90–?)
GLUCOSE SERPL-MCNC: 91 MG/DL (ref 74–106)
HCT VFR BLD AUTO: 44.5 % (ref 36–52)
HGB BLD-MCNC: 14.8 G/DL (ref 12–18)
LYMPHOCYTES # BLD AUTO: 2.3 K/UL (ref 2–11.5)
LYMPHOCYTES NFR BLD AUTO: 18.9 % (ref 20.5–51.1)
MAGNESIUM SERPL-MCNC: 2 MG/DL (ref 1.8–2.4)
MCH RBC QN AUTO: 31 PG (ref 27–31)
MCHC RBC AUTO-ENTMCNC: 33 G/DL (ref 33–37)
MCV RBC AUTO: 93.3 FL (ref 80–94)
MONOCYTES # BLD AUTO: 0.7 K/UL (ref 0.8–1)
MONOCYTES NFR BLD AUTO: 6 % (ref 1.7–9.3)
NEUTROPHILS # BLD AUTO: 9.1 K/UL (ref 1.8–7.7)
NEUTROPHILS NFR BLD AUTO: 74.8 % (ref 42.2–75.2)
PHOSPHATE SERPL-MCNC: 3.5 MG/DL (ref 2.5–4.9)
PLATELET # BLD AUTO: 292 K/UL (ref 140–450)
POTASSIUM SERPL-SCNC: 4 MMOL/L (ref 3.5–5.1)
RBC # BLD AUTO: 4.76 MIL/UL (ref 4.2–6.1)
SODIUM SERPL-SCNC: 142 MMOL/L (ref 136–145)
WBC # BLD AUTO: 12.2 K/UL (ref 4.8–10.8)

## 2021-05-19 RX ADMIN — METRONIDAZOLE SCH MLS/HR: 500 SOLUTION INTRAVENOUS at 06:40

## 2021-05-19 RX ADMIN — Medication SCH MG: at 09:36

## 2021-05-19 RX ADMIN — Medication SCH EACH: at 09:37

## 2021-05-19 RX ADMIN — RIVAROXABAN SCH MG: 15 TABLET, FILM COATED ORAL at 09:40

## 2021-05-19 NOTE — NUR
PRN ATIVAN GIVEN FOR ANXIETY. PT APPEARS RESTLESS AND AGITATED. NO DISTRESS NOTED. CALL 
LIGHT WITHIN REACH. WILL CONTINUE TO MONITOR

## 2021-05-19 NOTE — NUR
ADMINISTERED PRESCRIBED MEDS PER MD ORDER. PATIENT TOLERATED WELL. MEDICATION EDUCATION 
REINFORCEMENT NEEDED. SAFETY MEASURES IN PLACE. WILL CONTINUE TO MONITOR.

## 2021-05-19 NOTE — NUR
PATIENT IS DISCHARGED HOME W/ HOME HEALTH PENDING. PER CASE MANAGEMENT WILL MAKE 
ARRANGEMENTS AND CONTACT PATIENT/REP ONCE ASSIGNED. REMOVED PATIENT IV LINE, TELE MONITOR, 
ID BRACELET. PATIENT GIVEN SPONGE BATH AND CLEAN CLOTHES. GATHERED BELONGINGS AND PLACED IN 
PATIENT BAG. PATIENT UNABLE TO SIGN PAPERWORK, CONFUSED/REINFORCEMENT NEEDED. PATIENT TAKEN 
TO FRONT OF HOSPITAL WHERE HE WAS PICKED UP BY WIFE. PATIENT IS STABLE.

## 2021-05-19 NOTE — NUR
PATIENT AGITATED, FALL RISK WHEN ATTEMPTING TO GET OUT OF BED. ADMINISTERED PRESCRIBED MED 
FOR PRN ANXIETY. PATIENT TOLERATED WELL. MEDICATION EDUCATION REINFORCEMENT NEEDED. SAFETY 
MEASURES IN PLACE. WILL CONTINUE TO MONITOR.

## 2021-05-19 NOTE — NUR
PATIENT ASLEEP IN BED. RESPIRATIONS EVEN UNLABORED. NO DISTRESS NOTED. CALL LIGHT WITHIN 
REACH. WILL CONTINUE TO MONITOR

## 2021-05-20 ENCOUNTER — HOSPITAL ENCOUNTER (EMERGENCY)
Dept: HOSPITAL 26 - MED | Age: 71
LOS: 1 days | Discharge: ANOTHER HOSPITAL | End: 2021-05-21
Payer: COMMERCIAL

## 2021-05-20 VITALS — HEIGHT: 68 IN | BODY MASS INDEX: 25.76 KG/M2 | WEIGHT: 170 LBS

## 2021-05-20 VITALS — SYSTOLIC BLOOD PRESSURE: 162 MMHG | DIASTOLIC BLOOD PRESSURE: 64 MMHG

## 2021-05-20 DIAGNOSIS — R09.02: ICD-10-CM

## 2021-05-20 DIAGNOSIS — F17.200: ICD-10-CM

## 2021-05-20 DIAGNOSIS — I50.9: ICD-10-CM

## 2021-05-20 DIAGNOSIS — R06.02: Primary | ICD-10-CM

## 2021-05-20 DIAGNOSIS — J44.9: ICD-10-CM

## 2021-05-20 DIAGNOSIS — Z20.822: ICD-10-CM

## 2021-05-20 LAB
ALBUMIN FLD-MCNC: 3 G/DL (ref 3.4–5)
ANION GAP SERPL CALCULATED.3IONS-SCNC: 10.4 MMOL/L (ref 8–16)
AST SERPL-CCNC: 39 U/L (ref 15–37)
BASOPHILS # BLD AUTO: 0 K/UL (ref 0–0.22)
BASOPHILS NFR BLD AUTO: 0.3 % (ref 0–2)
BILIRUB SERPL-MCNC: 0.7 MG/DL (ref 0–1)
BUN SERPL-MCNC: 23 MG/DL (ref 7–18)
CHLORIDE SERPL-SCNC: 109 MMOL/L (ref 98–107)
CO2 SERPL-SCNC: 29.1 MMOL/L (ref 21–32)
CREAT SERPL-MCNC: 1.3 MG/DL (ref 0.6–1.3)
EOSINOPHIL # BLD AUTO: 0.1 K/UL (ref 0–0.4)
EOSINOPHIL NFR BLD AUTO: 0.7 % (ref 0–4)
ERYTHROCYTE [DISTWIDTH] IN BLOOD BY AUTOMATED COUNT: 13.1 % (ref 11.6–13.7)
GFR SERPL CREATININE-BSD FRML MDRD: (no result) ML/MIN (ref 90–?)
GLUCOSE SERPL-MCNC: 154 MG/DL (ref 74–106)
HCT VFR BLD AUTO: 45.2 % (ref 36–52)
HGB BLD-MCNC: 15.4 G/DL (ref 12–18)
LYMPHOCYTES # BLD AUTO: 2.5 K/UL (ref 2–11.5)
LYMPHOCYTES NFR BLD AUTO: 24.1 % (ref 20.5–51.1)
MCH RBC QN AUTO: 32 PG (ref 27–31)
MCHC RBC AUTO-ENTMCNC: 34 G/DL (ref 33–37)
MCV RBC AUTO: 94.9 FL (ref 80–94)
MONOCYTES # BLD AUTO: 0.9 K/UL (ref 0.8–1)
MONOCYTES NFR BLD AUTO: 8.6 % (ref 1.7–9.3)
NEUTROPHILS # BLD AUTO: 6.9 K/UL (ref 1.8–7.7)
NEUTROPHILS NFR BLD AUTO: 66.3 % (ref 42.2–75.2)
PLATELET # BLD AUTO: 337 K/UL (ref 140–450)
POTASSIUM SERPL-SCNC: 3.5 MMOL/L (ref 3.5–5.1)
PROTHROMBIN TIME: 11.9 SECS (ref 10.8–13.4)
RBC # BLD AUTO: 4.76 MIL/UL (ref 4.2–6.1)
SODIUM SERPL-SCNC: 145 MMOL/L (ref 136–145)
WBC # BLD AUTO: 10.4 K/UL (ref 4.8–10.8)

## 2021-05-20 NOTE — NUR
CALLED AND UPDATED PATIENTS WIFE HUNTER [phone] REGARDING PENDING 
ADMISSION. SHE VERBALIZED UNDERSTANDING.

## 2021-05-20 NOTE — NUR
O2 DECREASED TO 2L, SAT 95%. NO NOTED RESPIRATORY DISTRESS AT THIS TIME. 
PATIENT RESTING COMFORTABLY IN BED.

## 2021-05-20 NOTE — NUR
SPOKE WITH PATIENTS WIFE LISTED UNDER DEMOGRAPHIC DATA, HUNTER. WIFE REPORTS 
PATIENT LEFT AMA FROM HOSPITAL YESTERDAY. SHE DENIES THAT HE IS TAKING ANY 
MEDICATIONS REGULARLY. WIFE STATES AT HOME LATELY HE HAS BEEN RUNNING OUTSIDE 
WITHOUT CLOTHES AND TAKES NAPS FREQUENTLY IN THE DRIVEWAY. WIFE DENIES ANY 
CURRENT DRUG USE THAT SHE IS AWARE OF. BRENDA MADE AWARE.

## 2021-05-20 NOTE — NUR
PATIENT BIB SELF FOR C/O SOB X 1 HOUR. PATIENT A & O X4. PATIENT NOTED WITH O2 
SAT 88% ON ROOM AIR. SKIN IS WARM, DRY AND INTACT. AMBULATORY. SPEECH IS CLEAR. 
NO NOTED WHEEZING, BILATERAL LUNGS CLEAR THROUGHOUT. PATIENT REPORTS HE WAS 
RECENTLY ADMITTED TO HOSPITAL BUT LEFT AMA YESTERDAY. PATIENT DENIES COUGH. 
CONNECTED TO MONITOR AND PLACED ON 3L OF OXYGEN. PLACED IN GOWN. BED LOCKED AND 
IN LOWEST POSITION, BED RAIL X 2. SEE COMPLETE ASSESSMENT FOR FURTHER DETAILS. 



MED HX: COPD, CHF, A FIB

ALLERGIES: DENIES

-------------------------------------------------------------------------------

Addendum: 05/21/21 at 0123 by [email or web address]

-------------------------------------------------------------------------------

RECENT ADMISSION FOR PULMONARY EMBOLISM.

## 2021-05-21 VITALS — SYSTOLIC BLOOD PRESSURE: 114 MMHG | DIASTOLIC BLOOD PRESSURE: 70 MMHG

## 2021-05-21 NOTE — NUR
ATTEMPTED TO CALL PATIENTS WIFE, HUNTER, REGARDING TRANSFER TO [facility], NO 
ANSWER.

## 2021-05-21 NOTE — NUR
PATIENT NOTED TO HAVE SOILED PANTS AND BEDDING. PERINEAL CARE PERFORMED WITH 
ASSIST FROM [facility] MARROQUIN. PATIENT PLACED IN DIAPER AND BEDDING CHANGED. REDNESS 
NOTED TO PERINEAL AREA, PATIENT STATING HE FREQUENTLY GOES TO THE RESTROOM IN 
HIS PANTS. NOTED WITH DRY FECES ON LEG. PATIENT REPOSITIONED FOR COMFORT.

## 2021-05-21 NOTE — NUR
Patient to be transferred to [facility].  Is being transferred due to 
INSURANCE PURPOSES.  Receiving facility has accepting physician and available 
space. ER physician has signed transfer form.  Patient or responsible party has 
agreed to transfer and signed form.  Patient belongings inventoried and will be 
sent with patient.  Copy of nursing notes, lab reports, EKG, Physicians Orders 
and X-rays to be sent with patient.  Report called to ROMEO BERNAL at receiving 
facility. [facility] ambulance service has been called for transfer.  ETA is 90 
MINUTES.

## 2021-05-21 NOTE — NUR
PATIENTS WIFE RETURNED CALL. EXPLAINED PATIENT WILL BE TRANSFERED TO [facility]. SHE VERBALIZED UNDERSTANDING AND STATED SHE WOULD CALL [facility] IN 
THE MORNING.

## 2021-05-21 NOTE — NUR
Patient appears to be resting comfortably in bed. Vital Signs within normal 
limits. Respirations even and unlabored. NO NOTED RESPIRATORY DISTRESS. 
AWAITING [facility] ARRIVAL.

## 2021-05-21 NOTE — NUR
PATIENT NOTED TO BE STANDING OUTSIDE OF ROOM IN DIAPER. PATIENT WAS REDIRECTED 
BACK TO BED. PT COOPERATIVE AT THIS TIME. WILL CONTINUE TO MONITOR.

## 2021-05-21 NOTE — NUR
Patient appears to be resting comfortably in bed. Vital Signs within normal 
limits. Respirations even and unlabored. CURRENTLY ON ROOM AIR, 100%.

## 2022-01-28 ENCOUNTER — HOSPITAL ENCOUNTER (INPATIENT)
Dept: HOSPITAL 26 - MED | Age: 72
LOS: 5 days | Discharge: HOME | DRG: 871 | End: 2022-02-02
Payer: COMMERCIAL

## 2022-01-28 VITALS — HEIGHT: 68 IN | WEIGHT: 150 LBS | BODY MASS INDEX: 22.73 KG/M2

## 2022-01-28 VITALS — DIASTOLIC BLOOD PRESSURE: 106 MMHG | SYSTOLIC BLOOD PRESSURE: 156 MMHG

## 2022-01-28 DIAGNOSIS — I27.20: ICD-10-CM

## 2022-01-28 DIAGNOSIS — Z20.822: ICD-10-CM

## 2022-01-28 DIAGNOSIS — J69.0: ICD-10-CM

## 2022-01-28 DIAGNOSIS — Z59.00: ICD-10-CM

## 2022-01-28 DIAGNOSIS — A41.9: Primary | ICD-10-CM

## 2022-01-28 DIAGNOSIS — F17.210: ICD-10-CM

## 2022-01-28 DIAGNOSIS — J43.9: ICD-10-CM

## 2022-01-28 DIAGNOSIS — Z86.73: ICD-10-CM

## 2022-01-28 DIAGNOSIS — J32.3: ICD-10-CM

## 2022-01-28 DIAGNOSIS — D68.59: ICD-10-CM

## 2022-01-28 DIAGNOSIS — Z60.2: ICD-10-CM

## 2022-01-28 DIAGNOSIS — E43: ICD-10-CM

## 2022-01-28 DIAGNOSIS — F15.90: ICD-10-CM

## 2022-01-28 DIAGNOSIS — I48.91: ICD-10-CM

## 2022-01-28 DIAGNOSIS — I50.22: ICD-10-CM

## 2022-01-28 DIAGNOSIS — I26.99: ICD-10-CM

## 2022-01-28 DIAGNOSIS — I35.8: ICD-10-CM

## 2022-01-28 DIAGNOSIS — J96.01: ICD-10-CM

## 2022-01-28 PROCEDURE — G0482 DRUG TEST DEF 15-21 CLASSES: HCPCS

## 2022-01-28 PROCEDURE — U0003 INFECTIOUS AGENT DETECTION BY NUCLEIC ACID (DNA OR RNA); SEVERE ACUTE RESPIRATORY SYNDROME CORONAVIRUS 2 (SARS-COV-2) (CORONAVIRUS DISEASE [COVID-19]), AMPLIFIED PROBE TECHNIQUE, MAKING USE OF HIGH THROUGHPUT TECHNOLOGIES AS DESCRIBED BY CMS-2020-01-R: HCPCS

## 2022-01-28 SDOH — ECONOMIC STABILITY - HOUSING INSECURITY: HOMELESSNESS UNSPECIFIED: Z59.00

## 2022-01-28 SDOH — SOCIAL STABILITY - SOCIAL INSECURITY: PROBLEMS RELATED TO LIVING ALONE: Z60.2

## 2022-01-28 NOTE — NUR
PT IS BECOMING ANXIOUS AND SLIGHTLY RESTLESS. RR NOW 28-32. PT IS UNKEMPT AND 
HAS BEEN INCONTINENT OF URINE. DIFFICULT TO MAINTAIN MONITOR ON PT DUE TO 
RESTLESSNESS. DOES FOLLOW COMMANDS. IV ESTABLISHED LEFT A/C FOLLOWED BY MEDS AS 
ORDERED

## 2022-01-29 LAB
ALBUMIN FLD-MCNC: 2.2 G/DL (ref 3.4–5)
ANION GAP SERPL CALCULATED.3IONS-SCNC: 13 MMOL/L (ref 8–16)
AST SERPL-CCNC: 19 U/L (ref 15–37)
BASOPHILS # BLD AUTO: 0 K/UL (ref 0–0.22)
BASOPHILS # BLD AUTO: 0 K/UL (ref 0–0.22)
BASOPHILS NFR BLD AUTO: 0.1 % (ref 0–2)
BASOPHILS NFR BLD AUTO: 0.2 % (ref 0–2)
BILIRUB SERPL-MCNC: 0.8 MG/DL (ref 0–1)
BUN SERPL-MCNC: 22 MG/DL (ref 7–18)
CHLORIDE SERPL-SCNC: 107 MMOL/L (ref 98–107)
CO2 SERPL-SCNC: 28.8 MMOL/L (ref 21–32)
CREAT SERPL-MCNC: 1 MG/DL (ref 0.6–1.3)
EOSINOPHIL # BLD AUTO: 0 K/UL (ref 0–0.4)
EOSINOPHIL # BLD AUTO: 0 K/UL (ref 0–0.4)
EOSINOPHIL NFR BLD AUTO: 0 % (ref 0–4)
EOSINOPHIL NFR BLD AUTO: 0 % (ref 0–4)
ERYTHROCYTE [DISTWIDTH] IN BLOOD BY AUTOMATED COUNT: 12.8 % (ref 11.6–13.7)
ERYTHROCYTE [DISTWIDTH] IN BLOOD BY AUTOMATED COUNT: 13.1 % (ref 11.6–13.7)
GFR SERPL CREATININE-BSD FRML MDRD: (no result) ML/MIN (ref 90–?)
GLUCOSE SERPL-MCNC: 118 MG/DL (ref 74–106)
HCT VFR BLD AUTO: 37.7 % (ref 36–52)
HCT VFR BLD AUTO: 40.7 % (ref 36–52)
HGB BLD-MCNC: 12.8 G/DL (ref 12–18)
HGB BLD-MCNC: 13.7 G/DL (ref 12–18)
LYMPHOCYTES # BLD AUTO: 0.9 K/UL (ref 2–11.5)
LYMPHOCYTES # BLD AUTO: 1.9 K/UL (ref 2–11.5)
LYMPHOCYTES NFR BLD AUTO: 12.2 % (ref 20.5–51.1)
LYMPHOCYTES NFR BLD AUTO: 4.5 % (ref 20.5–51.1)
MCH RBC QN AUTO: 32 PG (ref 27–31)
MCH RBC QN AUTO: 32 PG (ref 27–31)
MCHC RBC AUTO-ENTMCNC: 34 G/DL (ref 33–37)
MCHC RBC AUTO-ENTMCNC: 34 G/DL (ref 33–37)
MCV RBC AUTO: 93.2 FL (ref 80–94)
MCV RBC AUTO: 94.2 FL (ref 80–94)
MONOCYTES # BLD AUTO: 1 K/UL (ref 0.8–1)
MONOCYTES # BLD AUTO: 1.2 K/UL (ref 0.8–1)
MONOCYTES NFR BLD AUTO: 4.7 % (ref 1.7–9.3)
MONOCYTES NFR BLD AUTO: 7.9 % (ref 1.7–9.3)
NEUTROPHILS # BLD AUTO: 12.4 K/UL (ref 1.8–7.7)
NEUTROPHILS # BLD AUTO: 18.3 K/UL (ref 1.8–7.7)
NEUTROPHILS NFR BLD AUTO: 79.7 % (ref 42.2–75.2)
NEUTROPHILS NFR BLD AUTO: 90.7 % (ref 42.2–75.2)
PLATELET # BLD AUTO: 235 K/UL (ref 140–450)
PLATELET # BLD AUTO: 250 K/UL (ref 140–450)
POTASSIUM SERPL-SCNC: 3.8 MMOL/L (ref 3.5–5.1)
PROTHROMBIN TIME: 11 SECS (ref 10.8–13.4)
RBC # BLD AUTO: 4.05 MIL/UL (ref 4.2–6.1)
RBC # BLD AUTO: 4.32 MIL/UL (ref 4.2–6.1)
SODIUM SERPL-SCNC: 145 MMOL/L (ref 136–145)
WBC # BLD AUTO: 15.5 K/UL (ref 4.8–10.8)
WBC # BLD AUTO: 20.2 K/UL (ref 4.8–10.8)

## 2022-01-29 RX ADMIN — DEXTROSE SCH MLS/HR: 50 INJECTION, SOLUTION INTRAVENOUS at 22:58

## 2022-01-29 RX ADMIN — SODIUM CHLORIDE SCH MLS/HR: 9 INJECTION, SOLUTION INTRAVENOUS at 23:22

## 2022-01-29 RX ADMIN — SODIUM CHLORIDE SCH MLS/HR: 9 INJECTION, SOLUTION INTRAVENOUS at 04:55

## 2022-01-29 RX ADMIN — LORATADINE SCH MG: 10 TABLET ORAL at 09:17

## 2022-01-29 RX ADMIN — PANTOPRAZOLE SODIUM SCH MG: 40 TABLET, DELAYED RELEASE ORAL at 09:18

## 2022-01-29 NOTE — NUR
PATIENT WAS BROUGHT TO [facility] ALERT, CONFUSED. NO ACUTE DISTRESS NOTED. RESPIRATION EVEN 
UNLABORED. O2 AT 4L NC TOLERATING WELL SATING AT 98%. SAFETY MEASURES IN PLACE. CALL LIGHT 
WITHIN REACH. ORIENTED TO CALL LIGHT, ROOM AND STAFF. MRSA SCREENING DONE. WILL CONTINUE TO 
MONITOR.

## 2022-01-29 NOTE — NUR
ATTEMPTED TO RECONNECT PATIENT TO BEDSIDE MONITOR, PATIENT CONTINUOUSLY 
DISCONNECTS PULSE OX AND BP CUFF. PATIENT INCONTINENT IN BED, ATTEMPTED TO HELP 
PATIENT WITH NEW LINENS AND BEDSIDE CARE. PATIENT DECLINES.

## 2022-01-29 NOTE — NUR
Patient appears to be resting comfortably in bed. Vital Signs within normal 
limits. Respirations even and unlabored. Will continue to monitor

## 2022-01-29 NOTE — NUR
PATIENT HAS BEEN SCREENED AND CATEGORIZED AS MODERATE NUTRITION RISK. PATIENT WILL BE SEEN 
WITHIN 3-5 DAYS OF ADMISSION.



01/30/22-01/31/22



NINA PAREKH MS, RDN

## 2022-01-29 NOTE — NUR
EKG WAS DONE SHOWING A-FIB WITH RVR. CARDIZEM IV GIVEN AS ORDERED. PT HAS BEEN 
INCONTINENT OF URINE. PT ATTEMPTS TO PUNCH WHEN TRYING TO CLEAN WET LINENS AND 
CLOTHES

## 2022-01-29 NOTE — NUR
PATIENT DISCONNECTED NASAL CANNULA, RECONNECTED PATIENT TO O2. PROVIDED WITH 
WARM BLANKET. PATIENT RESTING IN BED.

## 2022-01-29 NOTE — NUR
AWAKE. SPOT PULSE OX = 100%. RESPIRATIONS ARE REGULAR AT THIS TIME. PT 
CONTINUES TO PULL AT MONITOR LEADS

## 2022-01-29 NOTE — NUR
ATTEMPTED TO CONNECT PATIENT TO BEDSIDE MONITOR, PATIENT DISCONNECTS AND PULLS 
OFF PULSE OX AND CARDIAC LEADS.

## 2022-01-30 VITALS — SYSTOLIC BLOOD PRESSURE: 126 MMHG | DIASTOLIC BLOOD PRESSURE: 81 MMHG

## 2022-01-30 VITALS — SYSTOLIC BLOOD PRESSURE: 118 MMHG | DIASTOLIC BLOOD PRESSURE: 73 MMHG

## 2022-01-30 VITALS — DIASTOLIC BLOOD PRESSURE: 72 MMHG | SYSTOLIC BLOOD PRESSURE: 123 MMHG

## 2022-01-30 VITALS — DIASTOLIC BLOOD PRESSURE: 73 MMHG | SYSTOLIC BLOOD PRESSURE: 120 MMHG

## 2022-01-30 VITALS — SYSTOLIC BLOOD PRESSURE: 112 MMHG | DIASTOLIC BLOOD PRESSURE: 70 MMHG

## 2022-01-30 VITALS — SYSTOLIC BLOOD PRESSURE: 120 MMHG | DIASTOLIC BLOOD PRESSURE: 68 MMHG

## 2022-01-30 LAB
ANION GAP SERPL CALCULATED.3IONS-SCNC: 10.4 MMOL/L (ref 8–16)
BASOPHILS # BLD AUTO: 0 K/UL (ref 0–0.22)
BASOPHILS NFR BLD AUTO: 0.2 % (ref 0–2)
BUN SERPL-MCNC: 19 MG/DL (ref 7–18)
CHLORIDE SERPL-SCNC: 108 MMOL/L (ref 98–107)
CO2 SERPL-SCNC: 26.4 MMOL/L (ref 21–32)
CREAT SERPL-MCNC: 0.8 MG/DL (ref 0.6–1.3)
EOSINOPHIL # BLD AUTO: 0 K/UL (ref 0–0.4)
EOSINOPHIL NFR BLD AUTO: 0 % (ref 0–4)
ERYTHROCYTE [DISTWIDTH] IN BLOOD BY AUTOMATED COUNT: 13 % (ref 11.6–13.7)
GFR SERPL CREATININE-BSD FRML MDRD: (no result) ML/MIN (ref 90–?)
GLUCOSE SERPL-MCNC: 91 MG/DL (ref 74–106)
HCT VFR BLD AUTO: 35.6 % (ref 36–52)
HGB BLD-MCNC: 12.2 G/DL (ref 12–18)
LYMPHOCYTES # BLD AUTO: 1.5 K/UL (ref 2–11.5)
LYMPHOCYTES NFR BLD AUTO: 13 % (ref 20.5–51.1)
MCH RBC QN AUTO: 32 PG (ref 27–31)
MCHC RBC AUTO-ENTMCNC: 34 G/DL (ref 33–37)
MCV RBC AUTO: 92.4 FL (ref 80–94)
MONOCYTES # BLD AUTO: 1 K/UL (ref 0.8–1)
MONOCYTES NFR BLD AUTO: 8.2 % (ref 1.7–9.3)
NEUTROPHILS # BLD AUTO: 9.3 K/UL (ref 1.8–7.7)
NEUTROPHILS NFR BLD AUTO: 78.6 % (ref 42.2–75.2)
PLATELET # BLD AUTO: 247 K/UL (ref 140–450)
POTASSIUM SERPL-SCNC: 3.8 MMOL/L (ref 3.5–5.1)
RBC # BLD AUTO: 3.85 MIL/UL (ref 4.2–6.1)
SODIUM SERPL-SCNC: 141 MMOL/L (ref 136–145)
WBC # BLD AUTO: 11.8 K/UL (ref 4.8–10.8)

## 2022-01-30 RX ADMIN — HEPARIN SODIUM SCH MLS/HR: 5000 INJECTION, SOLUTION INTRAVENOUS at 00:16

## 2022-01-30 RX ADMIN — HEPARIN SODIUM SCH MLS/HR: 5000 INJECTION, SOLUTION INTRAVENOUS at 18:00

## 2022-01-30 RX ADMIN — METOPROLOL SUCCINATE SCH MG: 50 TABLET, EXTENDED RELEASE ORAL at 15:35

## 2022-01-30 RX ADMIN — DEXTROSE SCH MLS/HR: 50 INJECTION, SOLUTION INTRAVENOUS at 05:25

## 2022-01-30 RX ADMIN — PANTOPRAZOLE SODIUM SCH MG: 40 TABLET, DELAYED RELEASE ORAL at 09:23

## 2022-01-30 RX ADMIN — LORATADINE SCH MG: 10 TABLET ORAL at 09:23

## 2022-01-30 NOTE — NUR
RECEIVED PATIENT REPORT FROM NIGHT SHIFT NURSE FOR CONTINUITY OF CARE. PT IS AOX2. 
RESPIRATIONS EVEN AND UNLABORED. ON 4L NC. NO ACUTE DISTRESS NOTED. SKIN IS WARM, DRY, AND 
INTACT. IV SITE INTACT AND PATENT. DENIES PAIN AT THE MOMENT. PLAN OF CARE DISCUSSED. SAFETY 
MEASURES IN PLACE. CALL LIGHT WITHIN REACH. ORIENTED TO CALL LIGHT, ROOM AND STAFF. WILL 
CONTINUE TO MONITOR.

## 2022-01-30 NOTE — NUR
PATIENT NOT ALERT APPEARS LETHARGIC SLEEPY. ALSO APPEARS HOMELESS IS ON HEPARIN DRIP NEXT 
PTT 0000. PCR PENDING

PATIENT SAT 94% ON ROOM AIR. PATIENT WILL NOT KEEP ON N/C 3 LITERS. PATIENT SINUS TACH ON 
MONITOR 102 HEART RATE.

HEPARIN INFUSING AT 1400 UNITS HOUR. ALSO N S INFUSING AT 40 HOUR. NO DISTRESS NOTED.

## 2022-01-30 NOTE — NUR
(01/30/22) RD INITIAL ASSESSMENT COMPLETED

PLEASE REFER TO NUTRITION ASSESSMENT UNDER CARE ACTIVITY FOR ESTIMATED NUTRITIONAL NEEDS. 



RD RECOMMENDATIONS:

1. CONTINUE ON CARDIAC DIET AS TOLERATED.

2. CONSULT RDN PRN. 

3. RD WILL F/U 3-5 DAYS; MODERATE RISK. 



NINA PAREKH MS, RDN

## 2022-01-31 VITALS — DIASTOLIC BLOOD PRESSURE: 80 MMHG | SYSTOLIC BLOOD PRESSURE: 135 MMHG

## 2022-01-31 VITALS — DIASTOLIC BLOOD PRESSURE: 70 MMHG | SYSTOLIC BLOOD PRESSURE: 115 MMHG

## 2022-01-31 VITALS — SYSTOLIC BLOOD PRESSURE: 116 MMHG | DIASTOLIC BLOOD PRESSURE: 72 MMHG

## 2022-01-31 VITALS — DIASTOLIC BLOOD PRESSURE: 68 MMHG | SYSTOLIC BLOOD PRESSURE: 118 MMHG

## 2022-01-31 VITALS — SYSTOLIC BLOOD PRESSURE: 128 MMHG | DIASTOLIC BLOOD PRESSURE: 79 MMHG

## 2022-01-31 LAB
ANION GAP SERPL CALCULATED.3IONS-SCNC: 14.2 MMOL/L (ref 8–16)
BASOPHILS # BLD AUTO: 0 K/UL (ref 0–0.22)
BASOPHILS NFR BLD AUTO: 0.2 % (ref 0–2)
BUN SERPL-MCNC: 19 MG/DL (ref 7–18)
CHLORIDE SERPL-SCNC: 105 MMOL/L (ref 98–107)
CO2 SERPL-SCNC: 25.2 MMOL/L (ref 21–32)
CREAT SERPL-MCNC: 0.9 MG/DL (ref 0.6–1.3)
EOSINOPHIL # BLD AUTO: 0 K/UL (ref 0–0.4)
EOSINOPHIL NFR BLD AUTO: 0 % (ref 0–4)
ERYTHROCYTE [DISTWIDTH] IN BLOOD BY AUTOMATED COUNT: 12.9 % (ref 11.6–13.7)
GFR SERPL CREATININE-BSD FRML MDRD: (no result) ML/MIN (ref 90–?)
GLUCOSE SERPL-MCNC: 101 MG/DL (ref 74–106)
HCT VFR BLD AUTO: 41.1 % (ref 36–52)
HGB BLD-MCNC: 13.8 G/DL (ref 12–18)
LYMPHOCYTES # BLD AUTO: 1.3 K/UL (ref 2–11.5)
LYMPHOCYTES NFR BLD AUTO: 13 % (ref 20.5–51.1)
MCH RBC QN AUTO: 32 PG (ref 27–31)
MCHC RBC AUTO-ENTMCNC: 34 G/DL (ref 33–37)
MCV RBC AUTO: 94.3 FL (ref 80–94)
MONOCYTES # BLD AUTO: 0.6 K/UL (ref 0.8–1)
MONOCYTES NFR BLD AUTO: 6.2 % (ref 1.7–9.3)
NEUTROPHILS # BLD AUTO: 7.9 K/UL (ref 1.8–7.7)
NEUTROPHILS NFR BLD AUTO: 80.6 % (ref 42.2–75.2)
PLATELET # BLD AUTO: 299 K/UL (ref 140–450)
POTASSIUM SERPL-SCNC: 3.4 MMOL/L (ref 3.5–5.1)
RBC # BLD AUTO: 4.35 MIL/UL (ref 4.2–6.1)
SODIUM SERPL-SCNC: 141 MMOL/L (ref 136–145)
WBC # BLD AUTO: 9.8 K/UL (ref 4.8–10.8)

## 2022-01-31 RX ADMIN — PANTOPRAZOLE SODIUM SCH MG: 40 TABLET, DELAYED RELEASE ORAL at 09:56

## 2022-01-31 RX ADMIN — RIVAROXABAN SCH MG: 15 TABLET, FILM COATED ORAL at 21:25

## 2022-01-31 RX ADMIN — SODIUM CHLORIDE SCH MLS/HR: 9 INJECTION, SOLUTION INTRAVENOUS at 10:17

## 2022-01-31 RX ADMIN — LORATADINE SCH MG: 10 TABLET ORAL at 09:56

## 2022-01-31 RX ADMIN — RIVAROXABAN SCH MG: 15 TABLET, FILM COATED ORAL at 10:54

## 2022-01-31 RX ADMIN — LIDOCAINE HYDROCHLORIDE SCH MLS/HR: 10 INJECTION, SOLUTION EPIDURAL; INFILTRATION; INTRACAUDAL; PERINEURAL at 12:01

## 2022-01-31 RX ADMIN — METOPROLOL SUCCINATE SCH MG: 50 TABLET, EXTENDED RELEASE ORAL at 09:56

## 2022-01-31 NOTE — NUR
PATIENT IS SLEEPING. CHEST RISE AND FALL NOTED. AROUSABLE TO VOICE. NO SIGN OF DISTRESS 
NOTED. PRECAUTION IN PLACE. CALL LIGHT WITHIN REACH. WILL CONTINUE TO MONITOR.

## 2022-01-31 NOTE — NUR
TEXT DR BLAKE REGARDING PATIENT KEEP REMOVING IV LINE WHILE PT IS ON HEPARIN DRIP. DR FARR, 
NEW ORDER RECEIVED. TORB CHANGE ROCEPHIN 1000MG IVPB TO IM. ENDORSE TO PHARMACY THE NEW 
ORDER.

## 2022-01-31 NOTE — NUR
SCHEDULE MEDICATION GIVEN WITH EDUCATION. PATIENT TOLERATED WELL. NO SIGN OF DISTRESS NOTED. 
PRECAUTION IN PLACE. CALL LIGHT WITHIN REACH. WILL CONTINUE TO MONITOR.

## 2022-01-31 NOTE — NUR
CLEAN AND CHANGE BED SHEET. PATIENT TOLERATED WELL. PRECAUTION IN PLACE. CALL LIGHT WITHIN 
REACH. WILL CONTINUE TO MONITOR.

## 2022-01-31 NOTE — NUR
RECEIVED PATIENT FROM NIGHT SHIFT NURSE FOR CONTINUITY OF CARE. PATIENT IS SLEEPING, CHEST 
RISE AND FALL NOTED. AROUSABLE TO VOICE. A/A/O X2, WITH EPISODE OF CONFUSION. SKIN WARM, 
DRY, NON DIAPHORETIC. IV ON RIGHT HAND 24G, INTACT AND PATENT, IS INFUSING HEPARIN DRIP @ 
1400 UNIT/HR AND NS @40ML/HR. ABLE TO MAKE NEED KNOWN. DENIES ANY PAIN AT THIS TIME. PLAN OF 
CARE DISCUSSED, PATIENT VERBALIZED UNDERSTANDING. PRECAUTION IN PLACE. CALL LIGHT WITHIN 
REACH. WILL CONTINUE TO MONITOR.

## 2022-01-31 NOTE — NUR
PATIENT PTT RESULTS FROM LAB AT 0055  58.4 NO CHANGE. HEPARIN CONTINUE TO RUN 1400 UNITS 
HOUR. NEXT PTT 0700.

## 2022-01-31 NOTE — NUR
PATIENT IS AWAKE, ALERT, VERBALLY RESPONSIVE IN [place]. NO ACUTE DISTRESS NOTED. ON 2L O2 
VIA NC TOLERATING WELL. SAFETY MEASURES IN PLACE. CALL LIGHT WITHIN REACH

## 2022-02-01 VITALS — DIASTOLIC BLOOD PRESSURE: 69 MMHG | SYSTOLIC BLOOD PRESSURE: 121 MMHG

## 2022-02-01 VITALS — SYSTOLIC BLOOD PRESSURE: 114 MMHG | DIASTOLIC BLOOD PRESSURE: 81 MMHG

## 2022-02-01 VITALS — DIASTOLIC BLOOD PRESSURE: 79 MMHG | SYSTOLIC BLOOD PRESSURE: 139 MMHG

## 2022-02-01 LAB
ANION GAP SERPL CALCULATED.3IONS-SCNC: 14 MMOL/L (ref 8–16)
BASOPHILS # BLD AUTO: 0 K/UL (ref 0–0.22)
BASOPHILS NFR BLD AUTO: 0.3 % (ref 0–2)
BUN SERPL-MCNC: 19 MG/DL (ref 7–18)
CHLORIDE SERPL-SCNC: 106 MMOL/L (ref 98–107)
CO2 SERPL-SCNC: 23.6 MMOL/L (ref 21–32)
CREAT SERPL-MCNC: 0.8 MG/DL (ref 0.6–1.3)
EOSINOPHIL # BLD AUTO: 0 K/UL (ref 0–0.4)
EOSINOPHIL NFR BLD AUTO: 0.2 % (ref 0–4)
ERYTHROCYTE [DISTWIDTH] IN BLOOD BY AUTOMATED COUNT: 12.7 % (ref 11.6–13.7)
GFR SERPL CREATININE-BSD FRML MDRD: (no result) ML/MIN (ref 90–?)
GLUCOSE SERPL-MCNC: 78 MG/DL (ref 74–106)
HCT VFR BLD AUTO: 40.6 % (ref 36–52)
HGB BLD-MCNC: 13.7 G/DL (ref 12–18)
LYMPHOCYTES # BLD AUTO: 1.3 K/UL (ref 2–11.5)
LYMPHOCYTES NFR BLD AUTO: 17.2 % (ref 20.5–51.1)
MCH RBC QN AUTO: 32 PG (ref 27–31)
MCHC RBC AUTO-ENTMCNC: 34 G/DL (ref 33–37)
MCV RBC AUTO: 93.8 FL (ref 80–94)
MONOCYTES # BLD AUTO: 0.5 K/UL (ref 0.8–1)
MONOCYTES NFR BLD AUTO: 6.4 % (ref 1.7–9.3)
NEUTROPHILS # BLD AUTO: 5.8 K/UL (ref 1.8–7.7)
NEUTROPHILS NFR BLD AUTO: 75.9 % (ref 42.2–75.2)
PLATELET # BLD AUTO: 304 K/UL (ref 140–450)
POTASSIUM SERPL-SCNC: 3.6 MMOL/L (ref 3.5–5.1)
RBC # BLD AUTO: 4.33 MIL/UL (ref 4.2–6.1)
SODIUM SERPL-SCNC: 140 MMOL/L (ref 136–145)
WBC # BLD AUTO: 7.6 K/UL (ref 4.8–10.8)

## 2022-02-01 RX ADMIN — METOPROLOL SUCCINATE SCH MG: 50 TABLET, EXTENDED RELEASE ORAL at 09:00

## 2022-02-01 RX ADMIN — RIVAROXABAN SCH MG: 15 TABLET, FILM COATED ORAL at 09:00

## 2022-02-01 RX ADMIN — PANTOPRAZOLE SODIUM SCH MG: 40 TABLET, DELAYED RELEASE ORAL at 09:00

## 2022-02-01 RX ADMIN — SODIUM CHLORIDE SCH MLS/HR: 9 INJECTION, SOLUTION INTRAVENOUS at 04:55

## 2022-02-01 RX ADMIN — RIVAROXABAN SCH MG: 15 TABLET, FILM COATED ORAL at 20:31

## 2022-02-01 RX ADMIN — LORATADINE SCH MG: 10 TABLET ORAL at 09:00

## 2022-02-01 RX ADMIN — LIDOCAINE HYDROCHLORIDE SCH MLS/HR: 10 INJECTION, SOLUTION EPIDURAL; INFILTRATION; INTRACAUDAL; PERINEURAL at 09:00

## 2022-02-01 NOTE — NUR
PT HAD LARGE BM. PT BREATHING IS EVEN AND UNLABORED. ON RA. NO S/S OF DISTRESS NOTED. AOX2. 
NO IV ACCESS. PT IS STABLE.

## 2022-02-01 NOTE — NUR
PT LAYING IN BED , BS CLEAR/DIMINISHED THROUGHOUT, NO SIGNS OF RESPIRATORY DISTRESS NOTED AT 
THIS TIME. PT IS CURRENTLY SATING 97% ON RA. WILL CONTINUE TO MONITOR.

## 2022-02-01 NOTE — NUR
RECEIVED BEDSIDE REPORT FROM NIGHT SHIFT NURSE FOR CONTINUITY OF CARE. PT BREATHING IS EVEN 
AND UNLABORED. ON RA. NO S/S OF DISTRESS NOTED. AOX2. NO IV ACCESS. IV MED IS GIVEN IM. PT 
IS STABLE.

## 2022-02-01 NOTE — NUR
RECEIVED PATIENT STANDING IN HALLWAY, RE-ORIENTED BACK TO ROOM, PATIENT COMPLIED. 



PATIENT IS AXO X 1-2, CONFUSED BUT ABLE TO RE-ORIENT, AMBULATORY WITHOUT ASSISTANCE, STEADY 
GAIT, DENIES PAIN/SOB/RESPIRATORY DISTRESS. INFORMED PATIENT TO REMAIN IN ROOM, PATIENT 
VERBALIZED UNDERSTANDING. 



WILL CONTINUE TO CLOSELY MONITOR.

## 2022-02-02 VITALS — SYSTOLIC BLOOD PRESSURE: 119 MMHG | DIASTOLIC BLOOD PRESSURE: 72 MMHG

## 2022-02-02 LAB
ANION GAP SERPL CALCULATED.3IONS-SCNC: 13.5 MMOL/L (ref 8–16)
BASOPHILS # BLD AUTO: 0 K/UL (ref 0–0.22)
BASOPHILS NFR BLD AUTO: 0.4 % (ref 0–2)
BUN SERPL-MCNC: 21 MG/DL (ref 7–18)
CHLORIDE SERPL-SCNC: 106 MMOL/L (ref 98–107)
CO2 SERPL-SCNC: 24.4 MMOL/L (ref 21–32)
CREAT SERPL-MCNC: 0.9 MG/DL (ref 0.6–1.3)
EOSINOPHIL # BLD AUTO: 0 K/UL (ref 0–0.4)
EOSINOPHIL NFR BLD AUTO: 0.6 % (ref 0–4)
ERYTHROCYTE [DISTWIDTH] IN BLOOD BY AUTOMATED COUNT: 12.9 % (ref 11.6–13.7)
GFR SERPL CREATININE-BSD FRML MDRD: (no result) ML/MIN (ref 90–?)
GLUCOSE SERPL-MCNC: 93 MG/DL (ref 74–106)
HCT VFR BLD AUTO: 39.7 % (ref 36–52)
HGB BLD-MCNC: 13.8 G/DL (ref 12–18)
LYMPHOCYTES # BLD AUTO: 1.4 K/UL (ref 2–11.5)
LYMPHOCYTES NFR BLD AUTO: 19 % (ref 20.5–51.1)
MCH RBC QN AUTO: 32 PG (ref 27–31)
MCHC RBC AUTO-ENTMCNC: 35 G/DL (ref 33–37)
MCV RBC AUTO: 92.3 FL (ref 80–94)
MONOCYTES # BLD AUTO: 0.6 K/UL (ref 0.8–1)
MONOCYTES NFR BLD AUTO: 7.5 % (ref 1.7–9.3)
NEUTROPHILS # BLD AUTO: 5.5 K/UL (ref 1.8–7.7)
NEUTROPHILS NFR BLD AUTO: 72.5 % (ref 42.2–75.2)
PLATELET # BLD AUTO: 352 K/UL (ref 140–450)
POTASSIUM SERPL-SCNC: 3.9 MMOL/L (ref 3.5–5.1)
RBC # BLD AUTO: 4.31 MIL/UL (ref 4.2–6.1)
SODIUM SERPL-SCNC: 140 MMOL/L (ref 136–145)
WBC # BLD AUTO: 7.5 K/UL (ref 4.8–10.8)

## 2022-02-02 RX ADMIN — RIVAROXABAN SCH MG: 15 TABLET, FILM COATED ORAL at 09:00

## 2022-02-02 RX ADMIN — LIDOCAINE HYDROCHLORIDE SCH MLS/HR: 10 INJECTION, SOLUTION EPIDURAL; INFILTRATION; INTRACAUDAL; PERINEURAL at 09:00

## 2022-02-02 RX ADMIN — PANTOPRAZOLE SODIUM SCH MG: 40 TABLET, DELAYED RELEASE ORAL at 09:00

## 2022-02-02 RX ADMIN — SODIUM CHLORIDE SCH MLS/HR: 9 INJECTION, SOLUTION INTRAVENOUS at 04:55

## 2022-02-02 RX ADMIN — METOPROLOL SUCCINATE SCH MG: 50 TABLET, EXTENDED RELEASE ORAL at 09:00

## 2022-02-02 RX ADMIN — LORATADINE SCH MG: 10 TABLET ORAL at 09:00

## 2022-02-02 NOTE — NUR
PATIENT ENDORSED TO ROMEO DEGROOT FOR CONTINUITY OF CARE. 



PATIENT SITTING IN BED EATING SANDWICH. PT STABLE.

## 2022-02-02 NOTE — NUR
PT LAYING IN BED , BS CLEAR/DIMINISHED THROUGHOUT, NO SIGNS OF RESPIRATORY DISTRESS NOTED AT 
THIS TIME. PT IS CURRENTLY SATING 98% ON RA. WILL CONTINUE TO MONITOR.

## 2023-02-20 NOTE — NUR
PT TAKEN TO BED 09 VIA W/C Metronidazole Counseling:  I discussed with the patient the risks of metronidazole including but not limited to seizures, nausea/vomiting, a metallic taste in the mouth, nausea/vomiting and severe allergy.